# Patient Record
Sex: MALE | Race: WHITE
[De-identification: names, ages, dates, MRNs, and addresses within clinical notes are randomized per-mention and may not be internally consistent; named-entity substitution may affect disease eponyms.]

---

## 2018-06-28 ENCOUNTER — RESULT REVIEW (OUTPATIENT)
Age: 63
End: 2018-06-28

## 2018-06-28 ENCOUNTER — EMERGENCY (EMERGENCY)
Facility: HOSPITAL | Age: 63
LOS: 0 days | Discharge: HOME | End: 2018-06-28
Attending: EMERGENCY MEDICINE | Admitting: EMERGENCY MEDICINE

## 2018-06-28 VITALS
DIASTOLIC BLOOD PRESSURE: 61 MMHG | OXYGEN SATURATION: 97 % | SYSTOLIC BLOOD PRESSURE: 119 MMHG | HEART RATE: 76 BPM | RESPIRATION RATE: 18 BRPM

## 2018-06-28 VITALS
HEART RATE: 99 BPM | TEMPERATURE: 97 F | DIASTOLIC BLOOD PRESSURE: 94 MMHG | SYSTOLIC BLOOD PRESSURE: 135 MMHG | OXYGEN SATURATION: 97 % | RESPIRATION RATE: 18 BRPM

## 2018-06-28 DIAGNOSIS — R11.2 NAUSEA WITH VOMITING, UNSPECIFIED: ICD-10-CM

## 2018-06-28 DIAGNOSIS — K56.609 UNSPECIFIED INTESTINAL OBSTRUCTION, UNSPECIFIED AS TO PARTIAL VERSUS COMPLETE OBSTRUCTION: Chronic | ICD-10-CM

## 2018-06-28 DIAGNOSIS — R07.89 OTHER CHEST PAIN: ICD-10-CM

## 2018-06-28 DIAGNOSIS — Z87.19 PERSONAL HISTORY OF OTHER DISEASES OF THE DIGESTIVE SYSTEM: ICD-10-CM

## 2018-06-28 DIAGNOSIS — R42 DIZZINESS AND GIDDINESS: ICD-10-CM

## 2018-06-28 DIAGNOSIS — R07.9 CHEST PAIN, UNSPECIFIED: ICD-10-CM

## 2018-06-28 DIAGNOSIS — F41.9 ANXIETY DISORDER, UNSPECIFIED: ICD-10-CM

## 2018-06-28 DIAGNOSIS — K40.30 UNILATERAL INGUINAL HERNIA, WITH OBSTRUCTION, WITHOUT GANGRENE, NOT SPECIFIED AS RECURRENT: Chronic | ICD-10-CM

## 2018-06-28 DIAGNOSIS — T18.128A FOOD IN ESOPHAGUS CAUSING OTHER INJURY, INITIAL ENCOUNTER: ICD-10-CM

## 2018-06-28 DIAGNOSIS — F43.9 REACTION TO SEVERE STRESS, UNSPECIFIED: ICD-10-CM

## 2018-06-28 LAB
ALBUMIN SERPL ELPH-MCNC: 4.4 G/DL — SIGNIFICANT CHANGE UP (ref 3.5–5.2)
ALP SERPL-CCNC: 54 U/L — SIGNIFICANT CHANGE UP (ref 30–115)
ALT FLD-CCNC: 27 U/L — SIGNIFICANT CHANGE UP (ref 0–41)
ANION GAP SERPL CALC-SCNC: 13 MMOL/L — SIGNIFICANT CHANGE UP (ref 7–14)
APTT BLD: 28 SEC — SIGNIFICANT CHANGE UP (ref 27–39.2)
AST SERPL-CCNC: 20 U/L — SIGNIFICANT CHANGE UP (ref 0–41)
BILIRUB SERPL-MCNC: 0.8 MG/DL — SIGNIFICANT CHANGE UP (ref 0.2–1.2)
BUN SERPL-MCNC: 18 MG/DL — SIGNIFICANT CHANGE UP (ref 10–20)
CALCIUM SERPL-MCNC: 9.6 MG/DL — SIGNIFICANT CHANGE UP (ref 8.5–10.1)
CHLORIDE SERPL-SCNC: 106 MMOL/L — SIGNIFICANT CHANGE UP (ref 98–110)
CK MB CFR SERPL CALC: 2.9 NG/ML — SIGNIFICANT CHANGE UP (ref 0.6–6.3)
CK SERPL-CCNC: 232 U/L — HIGH (ref 0–225)
CO2 SERPL-SCNC: 24 MMOL/L — SIGNIFICANT CHANGE UP (ref 17–32)
CREAT SERPL-MCNC: 1.1 MG/DL — SIGNIFICANT CHANGE UP (ref 0.7–1.5)
GLUCOSE SERPL-MCNC: 110 MG/DL — HIGH (ref 70–99)
HCT VFR BLD CALC: 49.4 % — SIGNIFICANT CHANGE UP (ref 42–52)
HGB BLD-MCNC: 17 G/DL — SIGNIFICANT CHANGE UP (ref 14–18)
INR BLD: 1.06 RATIO — SIGNIFICANT CHANGE UP (ref 0.65–1.3)
MCHC RBC-ENTMCNC: 28.4 PG — SIGNIFICANT CHANGE UP (ref 27–31)
MCHC RBC-ENTMCNC: 34.4 G/DL — SIGNIFICANT CHANGE UP (ref 32–37)
MCV RBC AUTO: 82.6 FL — SIGNIFICANT CHANGE UP (ref 80–94)
NRBC # BLD: 0 /100 WBCS — SIGNIFICANT CHANGE UP (ref 0–0)
PLATELET # BLD AUTO: 130 K/UL — SIGNIFICANT CHANGE UP (ref 130–400)
POTASSIUM SERPL-MCNC: 4.1 MMOL/L — SIGNIFICANT CHANGE UP (ref 3.5–5)
POTASSIUM SERPL-SCNC: 4.1 MMOL/L — SIGNIFICANT CHANGE UP (ref 3.5–5)
PROT SERPL-MCNC: 7.1 G/DL — SIGNIFICANT CHANGE UP (ref 6–8)
PROTHROM AB SERPL-ACNC: 11.4 SEC — SIGNIFICANT CHANGE UP (ref 9.95–12.87)
RBC # BLD: 5.98 M/UL — SIGNIFICANT CHANGE UP (ref 4.7–6.1)
RBC # FLD: 12 % — SIGNIFICANT CHANGE UP (ref 11.5–14.5)
SODIUM SERPL-SCNC: 143 MMOL/L — SIGNIFICANT CHANGE UP (ref 135–146)
TROPONIN T SERPL-MCNC: <0.01 NG/ML — SIGNIFICANT CHANGE UP
TYPE + AB SCN PNL BLD: SIGNIFICANT CHANGE UP
WBC # BLD: 12.02 K/UL — HIGH (ref 4.8–10.8)
WBC # FLD AUTO: 12.02 K/UL — HIGH (ref 4.8–10.8)

## 2018-06-28 RX ORDER — GLUCAGON INJECTION, SOLUTION 0.5 MG/.1ML
1 INJECTION, SOLUTION SUBCUTANEOUS ONCE
Qty: 0 | Refills: 0 | Status: COMPLETED | OUTPATIENT
Start: 2018-06-28 | End: 2018-06-28

## 2018-06-28 RX ORDER — PANTOPRAZOLE SODIUM 20 MG/1
1 TABLET, DELAYED RELEASE ORAL
Qty: 28 | Refills: 0
Start: 2018-06-28 | End: 2018-07-11

## 2018-06-28 RX ORDER — SUCRALFATE 1 G
1 TABLET ORAL
Qty: 42 | Refills: 0
Start: 2018-06-28 | End: 2018-07-11

## 2018-06-28 RX ORDER — SODIUM CHLORIDE 9 MG/ML
1000 INJECTION, SOLUTION INTRAVENOUS ONCE
Qty: 0 | Refills: 0 | Status: COMPLETED | OUTPATIENT
Start: 2018-06-28 | End: 2018-06-28

## 2018-06-28 RX ORDER — METOCLOPRAMIDE HCL 10 MG
10 TABLET ORAL ONCE
Qty: 0 | Refills: 0 | Status: COMPLETED | OUTPATIENT
Start: 2018-06-28 | End: 2018-06-28

## 2018-06-28 RX ORDER — ONDANSETRON 8 MG/1
4 TABLET, FILM COATED ORAL ONCE
Qty: 0 | Refills: 0 | Status: COMPLETED | OUTPATIENT
Start: 2018-06-28 | End: 2018-06-28

## 2018-06-28 RX ORDER — DIPHENHYDRAMINE HCL 50 MG
25 CAPSULE ORAL ONCE
Qty: 0 | Refills: 0 | Status: COMPLETED | OUTPATIENT
Start: 2018-06-28 | End: 2018-06-28

## 2018-06-28 RX ADMIN — ONDANSETRON 4 MILLIGRAM(S): 8 TABLET, FILM COATED ORAL at 02:14

## 2018-06-28 RX ADMIN — GLUCAGON INJECTION, SOLUTION 1 MILLIGRAM(S): 0.5 INJECTION, SOLUTION SUBCUTANEOUS at 02:15

## 2018-06-28 RX ADMIN — Medication 10 MILLIGRAM(S): at 05:06

## 2018-06-28 RX ADMIN — Medication 25 MILLIGRAM(S): at 05:06

## 2018-06-28 RX ADMIN — SODIUM CHLORIDE 2000 MILLILITER(S): 9 INJECTION, SOLUTION INTRAVENOUS at 05:58

## 2018-06-28 RX ADMIN — GLUCAGON INJECTION, SOLUTION 1 MILLIGRAM(S): 0.5 INJECTION, SOLUTION SUBCUTANEOUS at 05:00

## 2018-06-28 RX ADMIN — ONDANSETRON 4 MILLIGRAM(S): 8 TABLET, FILM COATED ORAL at 03:58

## 2018-06-28 RX ADMIN — SODIUM CHLORIDE 2000 MILLILITER(S): 9 INJECTION, SOLUTION INTRAVENOUS at 03:18

## 2018-06-28 NOTE — ED PROVIDER NOTE - MEDICAL DECISION MAKING DETAILS
f/u with Dr. Osman in 1 week. Patient was instructed to have a clear liquid diet today, 1 week of puree before advancing diet. Start BID protonix and carafate TID.

## 2018-06-28 NOTE — ED PROVIDER NOTE - PROGRESS NOTE DETAILS
spoke w/ GI, will come in, pt likely needs emergent endoscopy. Requests second dose of glucagon. I signed out to Dr. Galarza DW GI fellow. Food bolus in distal esophagus was pushed through into the stomach. Patient OK for discharge with f/u with Dr. Osman in 1 week. Patient was instructed to have a clear liquid diet today, 1 week of puree before advancing diet. Start BID protonix and carafate TID. Patient back from [facility]. Feeling much better, no longer having CP. Ambulating, speaking full sentences. Discussed the plan with him and wife. Ready for DC. Pt evaluated after came back from endoscopy. Comfortable, tolerating PO, ambulating with steady gait. Will discharge, wife will drive pt home.

## 2018-06-28 NOTE — CONSULT NOTE ADULT - SUBJECTIVE AND OBJECTIVE BOX
Chief Complaint:  Patient is a 62y old  Male who presents with a chief complaint of choking and feeling of some thing stuck in the throat.    HPI:    61 y/o M with PMH of incarcerated hernia s/p repair p/w CP and FB sensation in throat since having a choking episode while eating dinner. Pt had eaten a piece of pork, lasagna and soup and feels like a piece of food got stuck in his throat, pt vomited but sensation did not go away. Pt states he has been coughing up large amounts of phlegm and gagging since the episode. States he has been under a lot of stress since losing his son recently. In the ER, patient received 2 doses of Glucagon but still unable to hold his secretin and has been spitting a lot of phlegm.  No H/O similar symptoms in the past, never had EGD done in the past, had a colonoscopy done 10 yrs ago and does not know the results.	      Allergies:  No Known Allergies    Home Medications: None    Hospital Medications:  lactated ringers Bolus 1000 milliLiter(s) IV Bolus once      PMHX/PSHX:  No pertinent past medical history  Intestinal obstruction  Strangulated inguinal hernia      ROS:     General:  No wt loss, fevers, chills, night sweats, fatigue,   Eyes:  Good vision, no reported pain  ENT:  see hPI  CV:  No pain, palpitations, hypo/hypertension  Resp:  No dyspnea, cough, tachypnea, wheezing  GI:  See HPI  :  No pain, bleeding, incontinence, nocturia  Muscle:  No pain, weakness  Neuro:  No weakness, tingling, memory problems  Heme:  No petechiae, ecchymosis, easy bruisability  Skin:  No rash, edema      PHYSICAL EXAM:     GENERAL:  Appears stated age, well-groomed, well-nourished, looks uncomfortable and spitting up saliva continuously.  HEENT:  NC/AT,  conjunctivae clear and pink,  no JVD  CHEST:  Full & symmetric excursion, no increased effort, breath sounds clear  HEART:  Regular rhythm, S1, S2, no added souds  ABDOMEN:  Soft, non-tender, non-distended, normoactive bowel sounds,  no masses ,  NEURO:  Alert, oriented, non focal    Vital Signs:  Vital Signs Last 24 Hrs  T(C): 36.2 (28 Jun 2018 01:13), Max: 36.2 (28 Jun 2018 01:13)  T(F): 97.2 (28 Jun 2018 01:13), Max: 97.2 (28 Jun 2018 01:13)  HR: 99 (28 Jun 2018 01:13) (99 - 99)  BP: 135/94 (28 Jun 2018 01:13) (135/94 - 135/94)  BP(mean): --  RR: 18 (28 Jun 2018 01:13) (18 - 18)  SpO2: 97% (28 Jun 2018 01:13) (97% - 97%)  Daily     Daily     LABS:                        17.0   12.02 )-----------( 130      ( 28 Jun 2018 02:03 )             49.4     06-28    143  |  106  |  18  ----------------------------<  110<H>  4.1   |  24  |  1.1    Ca    9.6      28 Jun 2018 02:03    TPro  7.1  /  Alb  4.4  /  TBili  0.8  /  DBili  x   /  AST  20  /  ALT  27  /  AlkPhos  54  06-28    LIVER FUNCTIONS - ( 28 Jun 2018 02:03 )  Alb: 4.4 g/dL / Pro: 7.1 g/dL / ALK PHOS: 54 U/L / ALT: 27 U/L / AST: 20 U/L / GGT: x           PT/INR - ( 28 Jun 2018 02:03 )   PT: 11.40 sec;   INR: 1.06 ratio        PTT - ( 28 Jun 2018 02:03 )  PTT:28.0 sec

## 2018-06-28 NOTE — ED ADULT NURSE REASSESSMENT NOTE - NS ED NURSE REASSESS COMMENT FT1
Pt remains spitting up phlegm. Maintaining airway without difficulty, speaking in full sentences. Awaiting GI consult. Pt and family aware of status. Comfort and safety maintained.

## 2018-06-28 NOTE — ED PROVIDER NOTE - OBJECTIVE STATEMENT
61 y/o M pmh incarcerated hernia s/p repair p/w CP and FB sensation in throat since having a choking episode while eating dinner. Pt had lasagna and soup and feels like a piece of turkey got stuck in his throat, pt vomited but sensation did not go away. Pt states he has been coughing up large amounts of phlegm and gagging since the episode. Pt also c/o dizziness and chest tightness during the day yesterday. States he has been under a lot of stress since losing his son recently.

## 2018-06-28 NOTE — ED PROVIDER NOTE - PHYSICAL EXAMINATION
AOx4, Non toxic appearing, NAD, speaking in full sentences. Skin  warm and dry, no acute rash. Head normocephalic, atraumatic. Conjunctiva and sclera clear. MM moist, no nasal discharge.  Pharynx unremarkable. Neck supple nt, no meningeal signs. Heart RRR s1s2 nl, no rub/murmur. Lungs- No retractions, BS equal, CTAB, no stridor. Abdomen soft ntnd no r/g. Extremities- moves all.

## 2018-06-28 NOTE — CONSULT NOTE ADULT - ASSESSMENT
61 y/o M with PMH of incarcerated hernia s/p repair p/w CP and FB sensation in throat since having a choking episode while eating dinner, did not respond to 2 doses of glucagon and still spitting up saliva continuously.    # Food Impaction :    - Keep NPO, Gentle hydration.  - Hold SQ anticoagulation  - Labs and imaging reviewed.  - Will arrange for urgent EGD.  - Further management depends upon the findings of EGD.

## 2018-06-28 NOTE — ED PROVIDER NOTE - ATTENDING CONTRIBUTION TO CARE
I personally evaluated the patient. I reviewed the Resident’s or Physician Assistant’s note (as assigned above), and agree with the findings and plan except as documented in my note.  62 yr M with hx of SBO from a hernia s/p surgical reduction presents with complaints of midchest foreign body sensation and associated nausea and spitting up. Started after pt ate some lasagna and turkey for dinner. No SBO, LOC. Pt with recent anxiety and weakness that he attributes to stress due to the recent death of his son. VS reviewed, pt well appearing, NAD but constantly spitting up saliva. Head ncat, pharyngeal exam w/o erythema, edema or exudates. Normal s1s2 without any murmurs, Lungs CTAB with normal work of breathing. abd +BS, s/nd/nt, extremities wnl, neuro exam grossly normal. No acute skin rashes. Plan is EKG, labs CXR and reassess.

## 2018-06-28 NOTE — ED ADULT NURSE NOTE - OBJECTIVE STATEMENT
Pt reports eating lasagna and soup this evening. Pt had an episode of emesis while eating and then felt a pain in his throat and the middle of his chest. Pt also reports mild shortness of breath. Pt states he took 1 Benadryl to try and go to sleep and Nexium to relieve symptoms but cannot keep medication down. Denies radiating pain.

## 2018-06-29 LAB — SURGICAL PATHOLOGY STUDY: SIGNIFICANT CHANGE UP

## 2019-04-11 ENCOUNTER — EMERGENCY (EMERGENCY)
Facility: HOSPITAL | Age: 64
LOS: 0 days | Discharge: HOME | End: 2019-04-11
Attending: EMERGENCY MEDICINE | Admitting: EMERGENCY MEDICINE
Payer: COMMERCIAL

## 2019-04-11 VITALS
TEMPERATURE: 97 F | HEART RATE: 95 BPM | RESPIRATION RATE: 18 BRPM | DIASTOLIC BLOOD PRESSURE: 81 MMHG | SYSTOLIC BLOOD PRESSURE: 136 MMHG | HEIGHT: 74 IN | OXYGEN SATURATION: 99 % | WEIGHT: 220.02 LBS
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
## 2019-04-11 DIAGNOSIS — K56.609 UNSPECIFIED INTESTINAL OBSTRUCTION, UNSPECIFIED AS TO PARTIAL VERSUS COMPLETE OBSTRUCTION: Chronic | ICD-10-CM

## 2019-04-11 DIAGNOSIS — S69.90XA UNSPECIFIED INJURY OF UNSPECIFIED WRIST, HAND AND FINGER(S), INITIAL ENCOUNTER: ICD-10-CM
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
## 2019-04-11 DIAGNOSIS — S69.91XA UNSPECIFIED INJURY OF RIGHT WRIST, HAND AND FINGER(S), INITIAL ENCOUNTER: ICD-10-CM

## 2019-04-11 DIAGNOSIS — Z79.899 OTHER LONG TERM (CURRENT) DRUG THERAPY: ICD-10-CM
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
## 2019-04-11 DIAGNOSIS — K40.30 UNILATERAL INGUINAL HERNIA, WITH OBSTRUCTION, WITHOUT GANGRENE, NOT SPECIFIED AS RECURRENT: Chronic | ICD-10-CM

## 2019-04-11 PROCEDURE — 99283 EMERGENCY DEPT VISIT LOW MDM: CPT | Mod: 25

## 2019-04-11 PROCEDURE — 29125 APPL SHORT ARM SPLINT STATIC: CPT

## 2019-04-11 PROCEDURE — 73130 X-RAY EXAM OF HAND: CPT | Mod: 26,RT

## 2019-04-11 NOTE — ED PROVIDER NOTE - PHYSICAL EXAMINATION
msk: right hand - +grossly swollen, with tenderness along 3rd, 4th and 5th metacarpals. good rom of digits. no snuffbox tenderness, good rom at wrist, supinaoin and pronation and flexion at elbow    skin: no lacerations, warmpth

## 2019-04-11 NOTE — ED PROVIDER NOTE - CARE PROVIDER_API CALL
Jesus Lutz)  Orthopaedic Surgery  [address] [place] [address]  Phone: [phone]  Fax: [phone]  Follow Up Time:

## 2019-04-11 NOTE — ED PROVIDER NOTE - NS ED ROS FT
Review of Systems    Constitutional: (-) fever or chills  respiratory: (-) cough (-) shortness of breath  Cardiovascular: (-) syncope, palpitations or chest pain  Integumentary: (-) rash or painful lymph nodes  Neurological: (-) altered mental status, headache or head injury  msk: +throbbing right hand pain

## 2019-04-11 NOTE — ED PROVIDER NOTE - OBJECTIVE STATEMENT
64 y/o male presents s/p fall yesterday . patient c/o throbbing pain and swelling to right hand. patient is right hand dominant. patient without tingling to fingertips. patient denies any other injuries. patient without any deformity . +worse with movement

## 2019-04-11 NOTE — ED PROVIDER NOTE - HEME LYMPH, MLM
Refill Authorization Note     is requesting a refill authorization.    Brief assessment and rationale for refill: APPROVE; prr  Name and strength of medication: LEVOTHYROXINE 0.100MG (100MCG) TAB       Medication Therapy Plan: Hypothyroid stable lco lov, continue therapy; labs recently checked TSH wnl; approve 12 more    Medication reconciliation completed: No              How patient will take medication: 1 po daily           Comments:   Lab Results   Component Value Date    TSH 1.783 03/28/2019    TSH 0.445 12/27/2018    TSH 0.218 (L) 11/16/2018    FREET4 1.18 11/16/2018    FREET4 1.48 06/13/2018    FREET4 1.01 03/27/2018     APPOINTMENTS (past 12 m or future  authorizing provider)  LAST VISIT DATE  Arpan Washington MD 2/4/2019         NEXT VISIT DATE  Arpan Washington MD 4/1/2019         No adenopathy or splenomegaly. No cervical or inguinal lymphadenopathy.

## 2019-04-11 NOTE — ED PROVIDER NOTE - NSFOLLOWUPINSTRUCTIONS_ED_ALL_ED_FT
Sprain    A sprain is a stretch or tear in one of the tough, fiber-like tissues (ligaments) in your body. This is caused by an injury to the area such as a twisting mechanism. Symptoms include pain, swelling, or bruising. Rest that area over the next several days and slowly resume activity when tolerated. Ice can help with swelling and pain.     SEEK IMMEDIATE MEDICAL CARE IF YOU HAVE ANY OF THE FOLLOWING SYMPTOMS: worsening pain, inability to move that body part, numbness or tingling.      Contusion    A contusion is a deep bruise. Contusions are the result of a blunt injury to tissues and muscle fibers under the skin. The skin overlying the contusion may turn blue, purple, or yellow. Symptoms also include pain and swelling in the injured area.    SEEK IMMEDIATE MEDICAL CARE IF YOU HAVE ANY OF THE FOLLOWING SYMPTOMS: severe pain, numbness, tingling, pain, weakness, or skin color/temperature change in any part of your body distal to the injury.

## 2019-05-24 NOTE — ED PROVIDER NOTE - NS ED ROS FT
None
Constitutional: See HPI.  Eyes: No visual changes, eye pain or discharge.  ENMT: +Fb sensation  Cardiac: + chest pain. No SOB or edema. No chest pain with exertion.  Respiratory: No cough or respiratory distress.   GI: +nausea, vomiting. No diarrhea or abdominal pain.  : No dysuria, frequency or burning.  MS: No myalgia, muscle weakness, joint pain or back pain.  Neuro: No headache or weakness. No LOC.  Skin: No skin rash.

## 2019-11-13 ENCOUNTER — OUTPATIENT (OUTPATIENT)
Dept: OUTPATIENT SERVICES | Facility: HOSPITAL | Age: 64
LOS: 1 days | Discharge: HOME | End: 2019-11-13

## 2019-11-13 ENCOUNTER — OUTPATIENT (OUTPATIENT)
Dept: OUTPATIENT SERVICES | Facility: HOSPITAL | Age: 64
LOS: 1 days | Discharge: HOME | End: 2019-11-13
Payer: COMMERCIAL

## 2019-11-13 DIAGNOSIS — K40.30 UNILATERAL INGUINAL HERNIA, WITH OBSTRUCTION, WITHOUT GANGRENE, NOT SPECIFIED AS RECURRENT: Chronic | ICD-10-CM

## 2019-11-13 DIAGNOSIS — K56.609 UNSPECIFIED INTESTINAL OBSTRUCTION, UNSPECIFIED AS TO PARTIAL VERSUS COMPLETE OBSTRUCTION: Chronic | ICD-10-CM

## 2019-11-13 DIAGNOSIS — Z00.00 ENCOUNTER FOR GENERAL ADULT MEDICAL EXAMINATION WITHOUT ABNORMAL FINDINGS: ICD-10-CM

## 2019-11-13 DIAGNOSIS — R47.02 DYSPHASIA: ICD-10-CM

## 2019-11-13 PROCEDURE — 74220 X-RAY XM ESOPHAGUS 1CNTRST: CPT | Mod: 26

## 2019-11-18 ENCOUNTER — OUTPATIENT (OUTPATIENT)
Dept: OUTPATIENT SERVICES | Facility: HOSPITAL | Age: 64
LOS: 1 days | Discharge: HOME | End: 2019-11-18
Payer: COMMERCIAL

## 2019-11-18 ENCOUNTER — RESULT REVIEW (OUTPATIENT)
Age: 64
End: 2019-11-18

## 2019-11-18 ENCOUNTER — TRANSCRIPTION ENCOUNTER (OUTPATIENT)
Age: 64
End: 2019-11-18

## 2019-11-18 VITALS
WEIGHT: 223.99 LBS | HEIGHT: 74 IN | SYSTOLIC BLOOD PRESSURE: 137 MMHG | HEART RATE: 69 BPM | TEMPERATURE: 98 F | DIASTOLIC BLOOD PRESSURE: 79 MMHG | RESPIRATION RATE: 18 BRPM

## 2019-11-18 VITALS — HEART RATE: 67 BPM | SYSTOLIC BLOOD PRESSURE: 108 MMHG | DIASTOLIC BLOOD PRESSURE: 62 MMHG | RESPIRATION RATE: 18 BRPM

## 2019-11-18 DIAGNOSIS — K40.30 UNILATERAL INGUINAL HERNIA, WITH OBSTRUCTION, WITHOUT GANGRENE, NOT SPECIFIED AS RECURRENT: Chronic | ICD-10-CM

## 2019-11-18 DIAGNOSIS — K56.609 UNSPECIFIED INTESTINAL OBSTRUCTION, UNSPECIFIED AS TO PARTIAL VERSUS COMPLETE OBSTRUCTION: Chronic | ICD-10-CM

## 2019-11-18 PROCEDURE — 88312 SPECIAL STAINS GROUP 1: CPT | Mod: 26

## 2019-11-18 PROCEDURE — 88305 TISSUE EXAM BY PATHOLOGIST: CPT | Mod: 26

## 2019-11-19 PROBLEM — K20.0 EOSINOPHILIC ESOPHAGITIS: Chronic | Status: ACTIVE | Noted: 2019-11-18

## 2019-11-20 LAB — SURGICAL PATHOLOGY STUDY: SIGNIFICANT CHANGE UP

## 2019-11-21 DIAGNOSIS — K29.50 UNSPECIFIED CHRONIC GASTRITIS WITHOUT BLEEDING: ICD-10-CM

## 2019-11-21 DIAGNOSIS — K44.9 DIAPHRAGMATIC HERNIA WITHOUT OBSTRUCTION OR GANGRENE: ICD-10-CM

## 2019-11-21 DIAGNOSIS — K29.80 DUODENITIS WITHOUT BLEEDING: ICD-10-CM

## 2019-11-21 DIAGNOSIS — R13.10 DYSPHAGIA, UNSPECIFIED: ICD-10-CM

## 2019-11-21 DIAGNOSIS — K22.8 OTHER SPECIFIED DISEASES OF ESOPHAGUS: ICD-10-CM

## 2019-12-05 ENCOUNTER — OUTPATIENT (OUTPATIENT)
Dept: OUTPATIENT SERVICES | Facility: HOSPITAL | Age: 64
LOS: 1 days | Discharge: HOME | End: 2019-12-05

## 2019-12-05 ENCOUNTER — INPATIENT (INPATIENT)
Facility: HOSPITAL | Age: 64
LOS: 5 days | Discharge: HOME | End: 2019-12-11
Attending: INTERNAL MEDICINE | Admitting: INTERNAL MEDICINE
Payer: COMMERCIAL

## 2019-12-05 ENCOUNTER — OUTPATIENT (OUTPATIENT)
Dept: OUTPATIENT SERVICES | Facility: HOSPITAL | Age: 64
LOS: 1 days | Discharge: HOME | End: 2019-12-05
Payer: COMMERCIAL

## 2019-12-05 VITALS
HEART RATE: 89 BPM | RESPIRATION RATE: 16 BRPM | OXYGEN SATURATION: 98 % | DIASTOLIC BLOOD PRESSURE: 88 MMHG | TEMPERATURE: 98 F | SYSTOLIC BLOOD PRESSURE: 142 MMHG

## 2019-12-05 DIAGNOSIS — I50.9 HEART FAILURE, UNSPECIFIED: ICD-10-CM

## 2019-12-05 DIAGNOSIS — E03.9 HYPOTHYROIDISM, UNSPECIFIED: ICD-10-CM

## 2019-12-05 DIAGNOSIS — D50.9 IRON DEFICIENCY ANEMIA, UNSPECIFIED: ICD-10-CM

## 2019-12-05 DIAGNOSIS — K56.609 UNSPECIFIED INTESTINAL OBSTRUCTION, UNSPECIFIED AS TO PARTIAL VERSUS COMPLETE OBSTRUCTION: Chronic | ICD-10-CM

## 2019-12-05 DIAGNOSIS — R10.819 ABDOMINAL TENDERNESS, UNSPECIFIED SITE: ICD-10-CM

## 2019-12-05 DIAGNOSIS — E55.9 VITAMIN D DEFICIENCY, UNSPECIFIED: ICD-10-CM

## 2019-12-05 DIAGNOSIS — K40.30 UNILATERAL INGUINAL HERNIA, WITH OBSTRUCTION, WITHOUT GANGRENE, NOT SPECIFIED AS RECURRENT: Chronic | ICD-10-CM

## 2019-12-05 DIAGNOSIS — T78.1XXA OTHER ADVERSE FOOD REACTIONS, NOT ELSEWHERE CLASSIFIED, INITIAL ENCOUNTER: ICD-10-CM

## 2019-12-05 DIAGNOSIS — D50.8 OTHER IRON DEFICIENCY ANEMIAS: ICD-10-CM

## 2019-12-05 DIAGNOSIS — E11.9 TYPE 2 DIABETES MELLITUS WITHOUT COMPLICATIONS: ICD-10-CM

## 2019-12-05 DIAGNOSIS — N42.9 DISORDER OF PROSTATE, UNSPECIFIED: ICD-10-CM

## 2019-12-05 DIAGNOSIS — E78.00 PURE HYPERCHOLESTEROLEMIA, UNSPECIFIED: ICD-10-CM

## 2019-12-05 DIAGNOSIS — B37.0 CANDIDAL STOMATITIS: ICD-10-CM

## 2019-12-05 LAB
ALBUMIN SERPL ELPH-MCNC: 4.6 G/DL — SIGNIFICANT CHANGE UP (ref 3.5–5.2)
ALP SERPL-CCNC: 57 U/L — SIGNIFICANT CHANGE UP (ref 30–115)
ALT FLD-CCNC: 23 U/L — SIGNIFICANT CHANGE UP (ref 0–41)
ANION GAP SERPL CALC-SCNC: 18 MMOL/L — HIGH (ref 7–14)
APTT BLD: 28.1 SEC — SIGNIFICANT CHANGE UP (ref 27–39.2)
AST SERPL-CCNC: 21 U/L — SIGNIFICANT CHANGE UP (ref 0–41)
BASOPHILS # BLD AUTO: 0.04 K/UL — SIGNIFICANT CHANGE UP (ref 0–0.2)
BASOPHILS NFR BLD AUTO: 0.3 % — SIGNIFICANT CHANGE UP (ref 0–1)
BILIRUB SERPL-MCNC: 1.2 MG/DL — SIGNIFICANT CHANGE UP (ref 0.2–1.2)
BLD GP AB SCN SERPL QL: SIGNIFICANT CHANGE UP
BUN SERPL-MCNC: 14 MG/DL — SIGNIFICANT CHANGE UP (ref 10–20)
CALCIUM SERPL-MCNC: 9.6 MG/DL — SIGNIFICANT CHANGE UP (ref 8.5–10.1)
CHLORIDE SERPL-SCNC: 100 MMOL/L — SIGNIFICANT CHANGE UP (ref 98–110)
CO2 SERPL-SCNC: 23 MMOL/L — SIGNIFICANT CHANGE UP (ref 17–32)
CREAT SERPL-MCNC: 1.2 MG/DL — SIGNIFICANT CHANGE UP (ref 0.7–1.5)
EOSINOPHIL # BLD AUTO: 0.3 K/UL — SIGNIFICANT CHANGE UP (ref 0–0.7)
EOSINOPHIL NFR BLD AUTO: 2.5 % — SIGNIFICANT CHANGE UP (ref 0–8)
GLUCOSE SERPL-MCNC: 107 MG/DL — HIGH (ref 70–99)
HCT VFR BLD CALC: 55.4 % — HIGH (ref 42–52)
HGB BLD-MCNC: 18.5 G/DL — HIGH (ref 14–18)
IMM GRANULOCYTES NFR BLD AUTO: 0.4 % — HIGH (ref 0.1–0.3)
INR BLD: 1.12 RATIO — SIGNIFICANT CHANGE UP (ref 0.65–1.3)
LYMPHOCYTES # BLD AUTO: 24.8 % — SIGNIFICANT CHANGE UP (ref 20.5–51.1)
LYMPHOCYTES # BLD AUTO: 3.02 K/UL — SIGNIFICANT CHANGE UP (ref 1.2–3.4)
MCHC RBC-ENTMCNC: 29 PG — SIGNIFICANT CHANGE UP (ref 27–31)
MCHC RBC-ENTMCNC: 33.4 G/DL — SIGNIFICANT CHANGE UP (ref 32–37)
MCV RBC AUTO: 86.8 FL — SIGNIFICANT CHANGE UP (ref 80–94)
MONOCYTES # BLD AUTO: 1.01 K/UL — HIGH (ref 0.1–0.6)
MONOCYTES NFR BLD AUTO: 8.3 % — SIGNIFICANT CHANGE UP (ref 1.7–9.3)
NEUTROPHILS # BLD AUTO: 7.74 K/UL — HIGH (ref 1.4–6.5)
NEUTROPHILS NFR BLD AUTO: 63.7 % — SIGNIFICANT CHANGE UP (ref 42.2–75.2)
NRBC # BLD: 0 /100 WBCS — SIGNIFICANT CHANGE UP (ref 0–0)
PLATELET # BLD AUTO: 154 K/UL — SIGNIFICANT CHANGE UP (ref 130–400)
POTASSIUM SERPL-MCNC: 4.4 MMOL/L — SIGNIFICANT CHANGE UP (ref 3.5–5)
POTASSIUM SERPL-SCNC: 4.4 MMOL/L — SIGNIFICANT CHANGE UP (ref 3.5–5)
PROT SERPL-MCNC: 7.1 G/DL — SIGNIFICANT CHANGE UP (ref 6–8)
PROTHROM AB SERPL-ACNC: 12.9 SEC — HIGH (ref 9.95–12.87)
RBC # BLD: 6.38 M/UL — HIGH (ref 4.7–6.1)
RBC # FLD: 12.9 % — SIGNIFICANT CHANGE UP (ref 11.5–14.5)
SODIUM SERPL-SCNC: 141 MMOL/L — SIGNIFICANT CHANGE UP (ref 135–146)
TROPONIN T SERPL-MCNC: <0.01 NG/ML — SIGNIFICANT CHANGE UP
WBC # BLD: 12.16 K/UL — HIGH (ref 4.8–10.8)
WBC # FLD AUTO: 12.16 K/UL — HIGH (ref 4.8–10.8)

## 2019-12-05 PROCEDURE — 93010 ELECTROCARDIOGRAM REPORT: CPT

## 2019-12-05 PROCEDURE — 76857 US EXAM PELVIC LIMITED: CPT | Mod: 26

## 2019-12-05 PROCEDURE — 99285 EMERGENCY DEPT VISIT HI MDM: CPT

## 2019-12-05 PROCEDURE — 76700 US EXAM ABDOM COMPLETE: CPT | Mod: 26

## 2019-12-05 PROCEDURE — 71045 X-RAY EXAM CHEST 1 VIEW: CPT | Mod: 26

## 2019-12-05 RX ORDER — SODIUM CHLORIDE 9 MG/ML
1000 INJECTION INTRAMUSCULAR; INTRAVENOUS; SUBCUTANEOUS
Refills: 0 | Status: DISCONTINUED | OUTPATIENT
Start: 2019-12-05 | End: 2019-12-09

## 2019-12-05 RX ORDER — NIFEDIPINE 30 MG
30 TABLET, EXTENDED RELEASE 24 HR ORAL ONCE
Refills: 0 | Status: COMPLETED | OUTPATIENT
Start: 2019-12-05 | End: 2019-12-05

## 2019-12-05 RX ORDER — ALPRAZOLAM 0.25 MG
0.25 TABLET ORAL ONCE
Refills: 0 | Status: DISCONTINUED | OUTPATIENT
Start: 2019-12-05 | End: 2019-12-05

## 2019-12-05 RX ORDER — SODIUM CHLORIDE 9 MG/ML
1000 INJECTION INTRAMUSCULAR; INTRAVENOUS; SUBCUTANEOUS ONCE
Refills: 0 | Status: COMPLETED | OUTPATIENT
Start: 2019-12-05 | End: 2019-12-05

## 2019-12-05 RX ADMIN — SODIUM CHLORIDE 1000 MILLILITER(S): 9 INJECTION INTRAMUSCULAR; INTRAVENOUS; SUBCUTANEOUS at 18:36

## 2019-12-05 NOTE — ED PROVIDER NOTE - OBJECTIVE STATEMENT
64 year old male, pmhx of HTN, come sin at the request of his pmd for evaluation of elevated Hg, patient was found to have an elevated Hg of 20 as an OP, patient endorses mild headache, patient's father has a history of Polycythemia vera. No cp/sob, no n/v/d, no loc

## 2019-12-05 NOTE — ED PROVIDER NOTE - CLINICAL SUMMARY MEDICAL DECISION MAKING FREE TEXT BOX
I personally evaluated the patient. I reviewed the Resident’s or Physician Assistant’s note (as assigned above), and agree with the findings and plan except as documented in my note. PMD would like patient admitted for hemonc consultation and possible phlebotomy as needed.

## 2019-12-05 NOTE — ED PROVIDER NOTE - PHYSICAL EXAMINATION
CONSTITUTIONAL: Well-developed; well-nourished; in no acute distress. Sitting up and providing appropriate history and physical examination  SKIN: skin exam is warm and dry, no acute rash.  HEAD: Normocephalic; atraumatic.  EYES: PERRL, 3 mm bilateral, no nystagmus, EOM intact; conjunctiva and sclera clear.  ENT: + Dry MM, No nasal discharge; airway clear.  NECK: Supple; non tender. + full passive ROM in all directions. No JVD  CARD: S1, S2 normal; no murmurs, gallops, or rubs. Regular rate and rhythm. + Symmetric Strong Pulses  RESP: No wheezes, rales or rhonchi. Good air movement bilaterally  ABD: soft; non-distended; non-tender. No Rebound, No Guarding, No signs of peritonitis, No CVA tenderness. No pulsatile abdominal mass. + Strong and Symmetric Pulses  EXT: Normal ROM. No clubbing, cyanosis or edema. Dp and Pt Pulses intact. Cap refill less than 3 seconds  NEURO: CN 2-12 intact, normal finger to nose, normal romberg, stable gait, no sensory or motor deficits, Alert, oriented, grossly unremarkable. No Focal deficits. GCS 15. NIH 0  PSYCH: Cooperative, appropriate.

## 2019-12-06 DIAGNOSIS — Z02.9 ENCOUNTER FOR ADMINISTRATIVE EXAMINATIONS, UNSPECIFIED: ICD-10-CM

## 2019-12-06 LAB
ANION GAP SERPL CALC-SCNC: 15 MMOL/L — HIGH (ref 7–14)
BASOPHILS # BLD AUTO: 0.06 K/UL — SIGNIFICANT CHANGE UP (ref 0–0.2)
BASOPHILS NFR BLD AUTO: 0.7 % — SIGNIFICANT CHANGE UP (ref 0–1)
BUN SERPL-MCNC: 11 MG/DL — SIGNIFICANT CHANGE UP (ref 10–20)
CALCIUM SERPL-MCNC: 9.3 MG/DL — SIGNIFICANT CHANGE UP (ref 8.5–10.1)
CHLORIDE SERPL-SCNC: 102 MMOL/L — SIGNIFICANT CHANGE UP (ref 98–110)
CO2 SERPL-SCNC: 23 MMOL/L — SIGNIFICANT CHANGE UP (ref 17–32)
CREAT SERPL-MCNC: 1.1 MG/DL — SIGNIFICANT CHANGE UP (ref 0.7–1.5)
EOSINOPHIL # BLD AUTO: 0.31 K/UL — SIGNIFICANT CHANGE UP (ref 0–0.7)
EOSINOPHIL NFR BLD AUTO: 3.8 % — SIGNIFICANT CHANGE UP (ref 0–8)
GLUCOSE SERPL-MCNC: 101 MG/DL — HIGH (ref 70–99)
HCT VFR BLD CALC: 55.8 % — HIGH (ref 42–52)
HCV AB S/CO SERPL IA: 0.07 S/CO — SIGNIFICANT CHANGE UP (ref 0–0.99)
HCV AB SERPL-IMP: SIGNIFICANT CHANGE UP
HGB BLD-MCNC: 18.5 G/DL — HIGH (ref 14–18)
IMM GRANULOCYTES NFR BLD AUTO: 0.4 % — HIGH (ref 0.1–0.3)
LYMPHOCYTES # BLD AUTO: 2.52 K/UL — SIGNIFICANT CHANGE UP (ref 1.2–3.4)
LYMPHOCYTES # BLD AUTO: 30.5 % — SIGNIFICANT CHANGE UP (ref 20.5–51.1)
MAGNESIUM SERPL-MCNC: 2 MG/DL — SIGNIFICANT CHANGE UP (ref 1.8–2.4)
MCHC RBC-ENTMCNC: 28.8 PG — SIGNIFICANT CHANGE UP (ref 27–31)
MCHC RBC-ENTMCNC: 33.2 G/DL — SIGNIFICANT CHANGE UP (ref 32–37)
MCV RBC AUTO: 86.8 FL — SIGNIFICANT CHANGE UP (ref 80–94)
MONOCYTES # BLD AUTO: 0.69 K/UL — HIGH (ref 0.1–0.6)
MONOCYTES NFR BLD AUTO: 8.4 % — SIGNIFICANT CHANGE UP (ref 1.7–9.3)
NEUTROPHILS # BLD AUTO: 4.65 K/UL — SIGNIFICANT CHANGE UP (ref 1.4–6.5)
NEUTROPHILS NFR BLD AUTO: 56.2 % — SIGNIFICANT CHANGE UP (ref 42.2–75.2)
NRBC # BLD: 0 /100 WBCS — SIGNIFICANT CHANGE UP (ref 0–0)
PLATELET # BLD AUTO: 143 K/UL — SIGNIFICANT CHANGE UP (ref 130–400)
POTASSIUM SERPL-MCNC: 4.1 MMOL/L — SIGNIFICANT CHANGE UP (ref 3.5–5)
POTASSIUM SERPL-SCNC: 4.1 MMOL/L — SIGNIFICANT CHANGE UP (ref 3.5–5)
RBC # BLD: 6.43 M/UL — HIGH (ref 4.7–6.1)
RBC # FLD: 13.2 % — SIGNIFICANT CHANGE UP (ref 11.5–14.5)
SODIUM SERPL-SCNC: 140 MMOL/L — SIGNIFICANT CHANGE UP (ref 135–146)
WBC # BLD: 8.26 K/UL — SIGNIFICANT CHANGE UP (ref 4.8–10.8)
WBC # FLD AUTO: 8.26 K/UL — SIGNIFICANT CHANGE UP (ref 4.8–10.8)

## 2019-12-06 PROCEDURE — 99223 1ST HOSP IP/OBS HIGH 75: CPT | Mod: AI

## 2019-12-06 PROCEDURE — 99222 1ST HOSP IP/OBS MODERATE 55: CPT

## 2019-12-06 RX ORDER — MOMETASONE FUROATE 220 UG/1
2 INHALANT RESPIRATORY (INHALATION)
Refills: 0 | Status: DISCONTINUED | OUTPATIENT
Start: 2019-12-06 | End: 2019-12-11

## 2019-12-06 RX ORDER — ACETAMINOPHEN 500 MG
650 TABLET ORAL EVERY 6 HOURS
Refills: 0 | Status: DISCONTINUED | OUTPATIENT
Start: 2019-12-06 | End: 2019-12-11

## 2019-12-06 RX ORDER — PANTOPRAZOLE SODIUM 20 MG/1
40 TABLET, DELAYED RELEASE ORAL
Refills: 0 | Status: DISCONTINUED | OUTPATIENT
Start: 2019-12-06 | End: 2019-12-11

## 2019-12-06 RX ORDER — ENOXAPARIN SODIUM 100 MG/ML
40 INJECTION SUBCUTANEOUS DAILY
Refills: 0 | Status: DISCONTINUED | OUTPATIENT
Start: 2019-12-06 | End: 2019-12-11

## 2019-12-06 RX ORDER — FLUTICASONE PROPIONATE 220 MCG
2 AEROSOL WITH ADAPTER (GRAM) INHALATION
Refills: 0 | Status: DISCONTINUED | OUTPATIENT
Start: 2019-12-06 | End: 2019-12-06

## 2019-12-06 RX ORDER — ALPRAZOLAM 0.25 MG
0.25 TABLET ORAL AT BEDTIME
Refills: 0 | Status: DISCONTINUED | OUTPATIENT
Start: 2019-12-06 | End: 2019-12-09

## 2019-12-06 RX ORDER — CHLORHEXIDINE GLUCONATE 213 G/1000ML
1 SOLUTION TOPICAL
Refills: 0 | Status: DISCONTINUED | OUTPATIENT
Start: 2019-12-06 | End: 2019-12-11

## 2019-12-06 RX ORDER — NIFEDIPINE 30 MG
30 TABLET, EXTENDED RELEASE 24 HR ORAL DAILY
Refills: 0 | Status: DISCONTINUED | OUTPATIENT
Start: 2019-12-06 | End: 2019-12-11

## 2019-12-06 RX ADMIN — SODIUM CHLORIDE 75 MILLILITER(S): 9 INJECTION INTRAMUSCULAR; INTRAVENOUS; SUBCUTANEOUS at 15:56

## 2019-12-06 RX ADMIN — Medication 30 MILLIGRAM(S): at 00:01

## 2019-12-06 RX ADMIN — PANTOPRAZOLE SODIUM 40 MILLIGRAM(S): 20 TABLET, DELAYED RELEASE ORAL at 06:26

## 2019-12-06 RX ADMIN — Medication 0.25 MILLIGRAM(S): at 00:01

## 2019-12-06 RX ADMIN — ENOXAPARIN SODIUM 40 MILLIGRAM(S): 100 INJECTION SUBCUTANEOUS at 12:07

## 2019-12-06 RX ADMIN — CHLORHEXIDINE GLUCONATE 1 APPLICATION(S): 213 SOLUTION TOPICAL at 05:30

## 2019-12-06 RX ADMIN — Medication 650 MILLIGRAM(S): at 12:36

## 2019-12-06 RX ADMIN — Medication 650 MILLIGRAM(S): at 12:06

## 2019-12-06 RX ADMIN — Medication 0.25 MILLIGRAM(S): at 21:20

## 2019-12-06 RX ADMIN — SODIUM CHLORIDE 75 MILLILITER(S): 9 INJECTION INTRAMUSCULAR; INTRAVENOUS; SUBCUTANEOUS at 00:02

## 2019-12-06 RX ADMIN — Medication 30 MILLIGRAM(S): at 06:26

## 2019-12-06 NOTE — SWALLOW BEDSIDE ASSESSMENT ADULT - SWALLOW EVAL: DIAGNOSIS
+toleration for Dysphagia Diet III Mechanical soft consistency with cut up meats, regular, thins with no overt s/s aspiration vs penetration

## 2019-12-06 NOTE — SWALLOW BEDSIDE ASSESSMENT ADULT - COMMENTS
pt received OOB, AAOx4. normal breath patterns, on RA. pt reports esophageal discomfort during mealtime, consuming hard solids. functional oropharyngeal swallow +toleration with no overt s/s of aspiration vs penetration  pt prefers soft consistency

## 2019-12-06 NOTE — H&P ADULT - HISTORY OF PRESENT ILLNESS
65 y/o male with pMHx of HTN was sent in by his PMD Dr. Simon for evaluation of elevated Hgb. Pt reports he was having a headache the night before so he sent him at [facility] for blood work and was found to have hgb of 20. Pt says his father had a history of Polycythemia Vera. Pt admits to having a Headache, Dry mouth, intermittent fever/chills, intermittent itching, abdominal pain and generalized body aches started few days ago and progressively had gotten worse. Denies CP, SOB, nausea, vomiting, diarrhea, constipation and dysuria.     Pt also reports that he had EGD done recently for dysphagia. GE junction bx showed intraepithelial eosinophils and reflex esophagitis. says he is supposed to get another EGD.  ED vitals: /88, HR 89, RR 16, T 97.9F, sPO2 98%
[Menstruating] : menstruating

## 2019-12-06 NOTE — H&P ADULT - NSHPPHYSICALEXAM_GEN_ALL_CORE
GENERAL: NAD, Resting in bed  HEAD:  Atraumatic, Normocephalic  EYES: EOMI, PERRLA, conjunctiva and sclera clear  ENT: Moist mucous membranes  NECK: Supple, No JVD  CHEST/LUNG: Clear to auscultation bilaterally; No rales, rhonchi, wheezing, or rubs. Unlabored respirations  HEART: Regular rate and rhythm; No murmurs, rubs, or gallops  ABDOMEN: Bowel sounds present; Soft, Nontender, Nondistended.   EXTREMITIES:  No clubbing, cyanosis, or edema  NERVOUS SYSTEM:  Alert & Oriented X3  SKIN: flushed skin throughout GENERAL: NAD, Resting in bed  HEAD:  Atraumatic, Normocephalic  EYES: EOMI, PERRLA, conjunctiva and sclera clear  ENT: Moist mucous membranes  NECK: Supple, No JVD  Pulm: Clear to auscultation bilaterally; No rales, rhonchi, wheezing, or rubs. Unlabored respirations  CV:s1,s2,rrr  GI: Bowel sounds present; Soft, Nontender, Nondistended.   EXTREMITIES:  No clubbing, cyanosis, or edema  NERVOUS SYSTEM:  Alert & Oriented X3  SKIN: flushed skin throughout

## 2019-12-06 NOTE — H&P ADULT - NSHPLABSRESULTS_GEN_ALL_CORE
18.5   12.16 )-----------( 154      ( 05 Dec 2019 18:24 )             55.4       12-05    141  |  100  |  14  ----------------------------<  107<H>  4.4   |  23  |  1.2    Ca    9.6      05 Dec 2019 18:24    TPro  7.1  /  Alb  4.6  /  TBili  1.2  /  DBili  x   /  AST  21  /  ALT  23  /  AlkPhos  57  12-05            PT/INR - ( 05 Dec 2019 18:24 )   PT: 12.90 sec;   INR: 1.12 ratio         PTT - ( 05 Dec 2019 18:24 )  PTT:28.1 sec    Lactate Trend      CARDIAC MARKERS ( 05 Dec 2019 18:24 )  x     / <0.01 ng/mL / x     / x     / x        CAPILLARY BLOOD GLUCOSE      < from: US Urinary Bladder (12.05.19 @ 09:29) >    Impression    No post void residual.    Bilateral ureteral jets are not identified and therefore an obstructing   process at either ureterovesical junction cannot be excluded on this exam.    Prostate volume 36 cc.    < end of copied text >    < from: US Abdomen Complete (12.05.19 @ 09:28) >    IMPRESSION:    Echogenic liver consistent with fatty infiltration.    No evidence of gallstones or hydronephrosis    < end of copied text > 18.5   12.16 )-----------( 154      ( 05 Dec 2019 18:24 )             55.4       12-05    141  |  100  |  14  ----------------------------<  107<H>  4.4   |  23  |  1.2    Ca    9.6      05 Dec 2019 18:24    TPro  7.1  /  Alb  4.6  /  TBili  1.2  /  DBili  x   /  AST  21  /  ALT  23  /  AlkPhos  57  12-05            PT/INR - ( 05 Dec 2019 18:24 )   PT: 12.90 sec;   INR: 1.12 ratio         PTT - ( 05 Dec 2019 18:24 )  PTT:28.1 sec    Lactate Trend      CARDIAC MARKERS ( 05 Dec 2019 18:24 )  x     / <0.01 ng/mL / x     / x     / x        CAPILLARY BLOOD GLUCOSE      < from: US Urinary Bladder (12.05.19 @ 09:29) >    Impression    No post void residual.    Bilateral ureteral jets are not identified and therefore an obstructing   process at either ureterovesical junction cannot be excluded on this exam.    Prostate volume 36 cc.    < end of copied text >    < from: US Abdomen Complete (12.05.19 @ 09:28) >        Echogenic liver consistent with fatty infiltration.    No evidence of gallstones or hydronephrosis      Chest xray which I reviewed shows no acute changes   EKG which I reviewed shows NSR

## 2019-12-06 NOTE — SWALLOW BEDSIDE ASSESSMENT ADULT - SLP PERTINENT HISTORY OF CURRENT PROBLEM
63 y/o male with pMHx of HTN was sent in by his PMD Dr. Simon for evaluation of elevated Hgb. Pt reports he was having a headache the night before so he sent him at [facility] for blood work and was found to have hgb of 20. Pt says his father had a history of Polycythemia Vera. EGD done 2 weeks ago with EoE.

## 2019-12-06 NOTE — H&P ADULT - ASSESSMENT
65 y/o male with pMHx of HTN was sent in by his PMD Dr. Simon for evaluation of elevated Hgb.    #) Elevated hgb  - r/o polycythemia vera  - no splenomegaly as per abdominal US  - f/u serum EPO levels and JAK2 mutation screen  - f/u Heme/Onc  - cont NS at 75ml/hr    #) HTN - controlled  - cont home meds    #) Dysphagia  - f/u outpatient with GI  - f/u speech and swallow eval    #) DVT prophylaxis - Lovenox  #) GI prophylaxis - PPI  #) Diet -   Full Code 65 y/o male with pMHx of HTN was sent in by his PMD Dr. Simon for evaluation of elevated Hgb.    #) Elevated hgb  - r/o polycythemia vera  - no splenomegaly as per abdominal US  - f/u serum EPO levels and JAK2 and JAK2 exon 12 mutation screen  - f/u Heme/Onc  - cont NS at 75ml/hr    #) HTN - controlled  - cont home meds    #) Dysphagia  - f/u outpatient with GI  - f/u speech and swallow eval    #) DVT prophylaxis - Lovenox  #) GI prophylaxis - PPI  #) Diet -   Full Code 63 y/o male with pMHx of HTN was sent in by his PMD Dr. Simon for evaluation of elevated Hgb.    #) Elevated hgb  - r/o polycythemia vera  - no splenomegaly as per abdominal US  - f/u serum EPO levels and JAK2 and JAK2 exon 12 mutation screen  - f/u Heme/Onc  - cont NS at 75ml/hr    #) HTN - controlled  - cont home meds    #) Dysphagia  - f/u outpatient with GI  - f/u speech and swallow eval    #) DVT prophylaxis - Lovenox  #) GI prophylaxis - PPI  #) Diet - DASH  Full Code

## 2019-12-06 NOTE — H&P ADULT - ATTENDING COMMENTS
patient seen and examined independently     elevated HGB with headaches:  hgb 18.5 today   patient used to donate blood but has not done  it in a while he says. he donated blood without knowing that his hgb is high   erythropoietin level pending   JAK2 ordered   his father had polycythemia vera per patient     Dysphagia and dry mouth:   patient had recent EGD with Dr Soria. in november and showed the following:   Normal mucosa in the whole esophagus.   Normal mucosa in the stomach.  Normal mucosa in the whole examined duodenum.   The esophagus was diated with a 52 F mALONETY DILATOR W/O RESISTANCE.   Dilation in the lower third of the esophagus.   patient on dysphagia diet per speech and swallow   patient requested to be sen by Dr Soria will consult     #Progress Note Handoff  Pending (specify):  Hematology. GI   Family discussion:patient   Disposition: Home patient seen and examined independently     elevated HGB with headaches:  hgb 18.5 today   patient used to donate blood but has not done  it in a while he says. he donated blood without knowing that his hgb is high   erythropoietin level pending   JAK2 ordered   his father had polycythemia vera per patient     Dysphagia and dry mouth:   patient had recent EGD with Dr Soria. in november and showed the following:   Normal mucosa in the whole esophagus.   Normal mucosa in the stomach.  Normal mucosa in the whole examined duodenum.   The esophagus was diated with a 52 F mALONETY DILATOR W/O RESISTANCE.   Dilation in the lower third of the esophagus.   patient on dysphagia diet per speech and swallow   patient requested to be sen by Dr Soria will consult     #fatty liver: OPT follow up     #Progress Note Handoff  Pending (specify):  Hematology. GI   Family discussion:patient   Disposition: Home

## 2019-12-06 NOTE — H&P ADULT - NSICDXFAMILYHX_GEN_ALL_CORE_FT
FAMILY HISTORY:  Family history of bone cancer, mother; type not known.  Family history of heart disease, father  Family history of polycythemia vera, father

## 2019-12-06 NOTE — PATIENT PROFILE ADULT - CONTRAINDICATIONS & PRECAUTIONS (SELECT ALL THAT APPLY)
[facility] Hospitalist Progress Note    Date of Service: 2018    Chief Complaint  83 y.o. male admitted 2018 with SBO    Interval Problem Update  : Still not passing much gas. Lactic acidosis resolved. Ordered home health but declined by several agencies. SBFT now pending    Disposition  Pending improvement of SBO  Ordered home health but rejected by some agencies        Review of Systems   Constitutional: Negative for chills and fever.   Respiratory: Negative for cough, shortness of breath and wheezing.    Cardiovascular: Negative for chest pain.   Gastrointestinal: Positive for abdominal pain and nausea. Negative for constipation, diarrhea and vomiting.   Genitourinary: Negative for dysuria.   Neurological: Negative for headaches.      Physical Exam  Laboratory/Imaging   Hemodynamics  Temp (24hrs), Av.9 °C (98.5 °F), Min:36.4 °C (97.6 °F), Max:37.4 °C (99.4 °F)   Temperature: 36.5 °C (97.7 °F)  Pulse  Av.9  Min: 68  Max: 103   Blood Pressure : 123/70      Respiratory      Respiration: 16, Pulse Oximetry: 99 %        RUL Breath Sounds: Clear, RML Breath Sounds: Diminished, RLL Breath Sounds: Diminished, LEXA Breath Sounds: Clear, LLL Breath Sounds: Diminished    Fluids    Intake/Output Summary (Last 24 hours) at 18 1718  Last data filed at 18 1555   Gross per 24 hour   Intake             1000 ml   Output             2075 ml   Net            -1075 ml       Nutrition  Orders Placed This Encounter   Procedures   • Diet NPO     Standing Status:   Standing     Number of Occurrences:   1     Order Specific Question:   Restrict to:     Answer:   Ice Chips [2]     Physical Exam   Constitutional: He appears well-developed.   HENT:   Head: Normocephalic.   Cardiovascular: Normal rate.  Exam reveals no gallop.    Pulmonary/Chest: No respiratory distress. He has no wheezes.   Abdominal: He exhibits distension. There is tenderness. There is no rebound.   Neurological: He is alert.       Recent Labs       06/26/18   0255  06/27/18   0609   WBC  10.2  8.1   RBC  3.62*  3.53*   HEMOGLOBIN  11.0*  10.7*   HEMATOCRIT  33.4*  33.6*   MCV  92.3  95.2   MCH  30.4  30.3   MCHC  32.9*  31.8*   RDW  52.2*  55.8*   PLATELETCT  184  206   MPV  9.9  9.1     Recent Labs      06/26/18   0255  06/27/18   0609   SODIUM  135  139   POTASSIUM  3.8  4.1   CHLORIDE  106  109   CO2  20  21   GLUCOSE  156*  104*   BUN  30*  32*   CREATININE  1.12  1.23   CALCIUM  8.6  8.4*                      Assessment/Plan     * SBO (small bowel obstruction) ([facility])   Assessment & Plan    Initially trying conservative management with NPO  IV fluid hydration and symptomatic management for pain and nausea.   Dr. Gardner of surgery was consulted  NG tube to intermittent low wall suction.  SBFT pending        T2DM (type 2 diabetes mellitus) ([facility])   Assessment & Plan    No long-acting insulin while nothing by mouth, monitor with Accu-Cheks and cover with insulin sliding scale.        HLD (hyperlipidemia)   Assessment & Plan    Holding home statin for now while he is nothing by mouth. Resume once cleared by surgery for by mouth intake.        HTN (hypertension)   Assessment & Plan    Holding home antihypertensives for now while he is nothing by mouth, cover with when necessary IV antihypertensives.          Quality-Core Measures   Reviewed items::  Labs reviewed and Medications reviewed  Crater catheter::  No Carter  DVT prophylaxis pharmacological::  Heparin         Patient/surrogate refused vaccine...

## 2019-12-07 LAB
ALBUMIN SERPL ELPH-MCNC: 4.4 G/DL — SIGNIFICANT CHANGE UP (ref 3.5–5.2)
ALP SERPL-CCNC: 56 U/L — SIGNIFICANT CHANGE UP (ref 30–115)
ALT FLD-CCNC: 23 U/L — SIGNIFICANT CHANGE UP (ref 0–41)
ANION GAP SERPL CALC-SCNC: 15 MMOL/L — HIGH (ref 7–14)
AST SERPL-CCNC: 21 U/L — SIGNIFICANT CHANGE UP (ref 0–41)
BASOPHILS # BLD AUTO: 0.07 K/UL — SIGNIFICANT CHANGE UP (ref 0–0.2)
BASOPHILS NFR BLD AUTO: 0.7 % — SIGNIFICANT CHANGE UP (ref 0–1)
BILIRUB SERPL-MCNC: 1.2 MG/DL — SIGNIFICANT CHANGE UP (ref 0.2–1.2)
BUN SERPL-MCNC: 12 MG/DL — SIGNIFICANT CHANGE UP (ref 10–20)
CALCIUM SERPL-MCNC: 9.3 MG/DL — SIGNIFICANT CHANGE UP (ref 8.5–10.1)
CHLORIDE SERPL-SCNC: 102 MMOL/L — SIGNIFICANT CHANGE UP (ref 98–110)
CO2 SERPL-SCNC: 23 MMOL/L — SIGNIFICANT CHANGE UP (ref 17–32)
CREAT SERPL-MCNC: 1.1 MG/DL — SIGNIFICANT CHANGE UP (ref 0.7–1.5)
EOSINOPHIL # BLD AUTO: 0.31 K/UL — SIGNIFICANT CHANGE UP (ref 0–0.7)
EOSINOPHIL NFR BLD AUTO: 3.1 % — SIGNIFICANT CHANGE UP (ref 0–8)
EPO SERPL-MCNC: 2.3 MIU/ML — LOW (ref 2.6–18.5)
GLUCOSE SERPL-MCNC: 116 MG/DL — HIGH (ref 70–99)
HCT VFR BLD CALC: 53.3 % — HIGH (ref 42–52)
HGB BLD-MCNC: 18.1 G/DL — HIGH (ref 14–18)
IMM GRANULOCYTES NFR BLD AUTO: 0.3 % — SIGNIFICANT CHANGE UP (ref 0.1–0.3)
LYMPHOCYTES # BLD AUTO: 2.28 K/UL — SIGNIFICANT CHANGE UP (ref 1.2–3.4)
LYMPHOCYTES # BLD AUTO: 23.1 % — SIGNIFICANT CHANGE UP (ref 20.5–51.1)
MCHC RBC-ENTMCNC: 29.3 PG — SIGNIFICANT CHANGE UP (ref 27–31)
MCHC RBC-ENTMCNC: 34 G/DL — SIGNIFICANT CHANGE UP (ref 32–37)
MCV RBC AUTO: 86.2 FL — SIGNIFICANT CHANGE UP (ref 80–94)
MONOCYTES # BLD AUTO: 0.69 K/UL — HIGH (ref 0.1–0.6)
MONOCYTES NFR BLD AUTO: 7 % — SIGNIFICANT CHANGE UP (ref 1.7–9.3)
NEUTROPHILS # BLD AUTO: 6.48 K/UL — SIGNIFICANT CHANGE UP (ref 1.4–6.5)
NEUTROPHILS NFR BLD AUTO: 65.8 % — SIGNIFICANT CHANGE UP (ref 42.2–75.2)
NRBC # BLD: 0 /100 WBCS — SIGNIFICANT CHANGE UP (ref 0–0)
PLATELET # BLD AUTO: 141 K/UL — SIGNIFICANT CHANGE UP (ref 130–400)
POTASSIUM SERPL-MCNC: 3.7 MMOL/L — SIGNIFICANT CHANGE UP (ref 3.5–5)
POTASSIUM SERPL-SCNC: 3.7 MMOL/L — SIGNIFICANT CHANGE UP (ref 3.5–5)
PROT SERPL-MCNC: 7 G/DL — SIGNIFICANT CHANGE UP (ref 6–8)
RBC # BLD: 6.18 M/UL — HIGH (ref 4.7–6.1)
RBC # FLD: 12.8 % — SIGNIFICANT CHANGE UP (ref 11.5–14.5)
SODIUM SERPL-SCNC: 140 MMOL/L — SIGNIFICANT CHANGE UP (ref 135–146)
WBC # BLD: 9.86 K/UL — SIGNIFICANT CHANGE UP (ref 4.8–10.8)
WBC # FLD AUTO: 9.86 K/UL — SIGNIFICANT CHANGE UP (ref 4.8–10.8)

## 2019-12-07 PROCEDURE — 99233 SBSQ HOSP IP/OBS HIGH 50: CPT

## 2019-12-07 RX ADMIN — PANTOPRAZOLE SODIUM 40 MILLIGRAM(S): 20 TABLET, DELAYED RELEASE ORAL at 06:25

## 2019-12-07 RX ADMIN — SODIUM CHLORIDE 75 MILLILITER(S): 9 INJECTION INTRAMUSCULAR; INTRAVENOUS; SUBCUTANEOUS at 05:48

## 2019-12-07 RX ADMIN — Medication 0.25 MILLIGRAM(S): at 21:33

## 2019-12-07 RX ADMIN — Medication 30 MILLIGRAM(S): at 05:48

## 2019-12-07 RX ADMIN — ENOXAPARIN SODIUM 40 MILLIGRAM(S): 100 INJECTION SUBCUTANEOUS at 12:58

## 2019-12-07 NOTE — PROGRESS NOTE ADULT - ASSESSMENT
Patient is a 64y old  Male who presents with a chief complaint of Elevated Hgb (06 Dec 2019 17:23)    #Elevated hermoglobin secondary to suspicion of polycythemia vera  sp phlebotomy  workup pending  family history + for PV in father  hemoglobin level improved    #Dysphagia   awaiting gi consult dr carpenter   will order swallow study for now     #eosinophilic esophagitis ppi     #CKD 2     #DVT PPX    Progress Note Handoff    Pending:  will order swallowing study, gi consult , hem follow up    Family discussion: patient is of sound mind and agrees to plan of care    Disposition: Home___

## 2019-12-07 NOTE — PROGRESS NOTE ADULT - SUBJECTIVE AND OBJECTIVE BOX
DARNELL DANIELS  64y  [facility] [address]      Patient is a 64y old  Male who presents with a chief complaint of Elevated Hgb (06 Dec 2019 17:23)      INTERVAL HPI/OVERNIGHT EVENTS:    complaining of feeling of food stuck in upper esophagus    REVIEW OF SYSTEMS:  CONSTITUTIONAL: No fever, weight loss, or fatigue  EYES: No eye pain, visual disturbances, or discharge  ENMT:  No difficulty hearing, tinnitus, vertigo; No sinus or throat pain  NECK: No pain or stiffness  BREASTS: No pain, masses, or nipple discharge  RESPIRATORY: No cough, wheezing, chills or hemoptysis; No shortness of breath  CARDIOVASCULAR: No chest pain, palpitations, dizziness, or leg swelling  GASTROINTESTINAL:+ dysphagia upper  GENITOURINARY: No dysuria, frequency, hematuria, or incontinence  NEUROLOGICAL: No headaches, memory loss, loss of strength, numbness, or tremors  SKIN: No itching, burning, rashes, or lesions   LYMPH NODES: No enlarged glands  ENDOCRINE: No heat or cold intolerance; No hair loss  MUSCULOSKELETAL: No joint pain or swelling; No muscle, back, or extremity pain  PSYCHIATRIC: No depression, anxiety, mood swings, or difficulty sleeping  HEME/LYMPH: No easy bruising, or bleeding gums  ALLERY AND IMMUNOLOGIC: No hives or eczema  FAMILY HISTORY:  Family history of bone cancer: mother; type not known.  Family history of heart disease: father  Family history of polycythemia vera: father    T(C): 36.3 (12-07-19 @ 05:14), Max: 36.5 (12-06-19 @ 21:05)  HR: 94 (12-07-19 @ 12:00) (76 - 103)  BP: 137/63 (12-07-19 @ 12:00) (134/78 - 147/81)  RR: 18 (12-07-19 @ 12:00) (18 - 18)  SpO2: --  Wt(kg): --Vital Signs Last 24 Hrs  T(C): 36.3 (07 Dec 2019 05:14), Max: 36.5 (06 Dec 2019 21:05)  T(F): 97.4 (07 Dec 2019 05:14), Max: 97.7 (06 Dec 2019 21:05)  HR: 94 (07 Dec 2019 12:00) (76 - 103)  BP: 137/63 (07 Dec 2019 12:00) (134/78 - 147/81)  BP(mean): --  RR: 18 (07 Dec 2019 12:00) (18 - 18)  SpO2: --    PHYSICAL EXAM:  GENERAL: NAD, well-groomed, well-developed  HEAD:  Atraumatic, Normocephalic  EYES: EOMI, PERRLA, conjunctiva and sclera clear  ENMT: No tonsillar erythema, exudates, or enlargement; Moist mucous membranes, Good dentition, No lesions  NECK: Supple, No JVD, Normal thyroid  NERVOUS SYSTEM:  Alert & Oriented X3, Good concentration;   PULM: Clear to auscultation bilaterally  CARDIAC: Regular rate and rhythm; No murmurs, rubs, or gallops  GI: Soft, Nontender, Nondistended; Bowel sounds present  EXTREMITIES:  2+ Peripheral Pulses, No clubbing, cyanosis, or edema  LYMPH: No lymphadenopathy noted  SKIN: No rashes or lesions                            18.1   9.86  )-----------( 141      ( 07 Dec 2019 06:04 )             53.3   12-07    140  |  102  |  12  ----------------------------<  116<H>  3.7   |  23  |  1.1    Ca    9.3      07 Dec 2019 06:04  Mg     2.0     12-06    TPro  7.0  /  Alb  4.4  /  TBili  1.2  /  DBili  x   /  AST  21  /  ALT  23  /  AlkPhos  56  12-07            acetaminophen   Tablet .. 650 milliGRAM(s) Oral every 6 hours PRN  ALPRAZolam 0.25 milliGRAM(s) Oral at bedtime  chlorhexidine 4% Liquid 1 Application(s) Topical <User Schedule>  enoxaparin Injectable 40 milliGRAM(s) SubCutaneous daily  mometasone 220 MICROgram(s) Inhaler 2 Puff(s) Inhalation two times a day  NIFEdipine XL 30 milliGRAM(s) Oral daily  pantoprazole    Tablet 40 milliGRAM(s) Oral before breakfast  sodium chloride 0.9%. 1000 milliLiter(s) IV Continuous <Continuous>      HEALTH ISSUES - PROBLEM Dx:          Case Discussed with House Staff     Spectra [phone]

## 2019-12-08 LAB — GLUCOSE BLDC GLUCOMTR-MCNC: 112 MG/DL — HIGH (ref 70–99)

## 2019-12-08 PROCEDURE — 99233 SBSQ HOSP IP/OBS HIGH 50: CPT

## 2019-12-08 RX ADMIN — Medication 0.25 MILLIGRAM(S): at 21:10

## 2019-12-08 RX ADMIN — PANTOPRAZOLE SODIUM 40 MILLIGRAM(S): 20 TABLET, DELAYED RELEASE ORAL at 06:40

## 2019-12-08 RX ADMIN — ENOXAPARIN SODIUM 40 MILLIGRAM(S): 100 INJECTION SUBCUTANEOUS at 17:08

## 2019-12-08 RX ADMIN — Medication 30 MILLIGRAM(S): at 05:54

## 2019-12-08 NOTE — PROGRESS NOTE ADULT - SUBJECTIVE AND OBJECTIVE BOX
TIARRAJORDYDARNELL ANDRE  64y  [facility] [address]      Patient is a 64y old  Male who presents with a chief complaint of Elevated Hgb (07 Dec 2019 14:28)      INTERVAL HPI/OVERNIGHT EVENTS:  no acute events overnight       REVIEW OF SYSTEMS:  CONSTITUTIONAL: No fever, weight loss, or fatigue  EYES: No eye pain, visual disturbances, or discharge  ENMT:  right sided jaw pain   NECK: No pain or stiffness  BREASTS: No pain, masses, or nipple discharge  RESPIRATORY: No cough, wheezing, chills or hemoptysis; No shortness of breath  CARDIOVASCULAR: No chest pain, palpitations, dizziness, or leg swelling  GASTROINTESTINAL: intermittent feeling of food stuck in esophagus   GENITOURINARY: No dysuria, frequency, hematuria, or incontinence  NEUROLOGICAL: No headaches, memory loss, loss of strength, numbness, or tremors  SKIN: No itching, burning, rashes, or lesions   LYMPH NODES: No enlarged glands  ENDOCRINE: No heat or cold intolerance; No hair loss  MUSCULOSKELETAL: No joint pain or swelling; No muscle, back, or extremity pain  PSYCHIATRIC: No depression, anxiety, mood swings, or difficulty sleeping  HEME/LYMPH: No easy bruising, or bleeding gums  ALLERY AND IMMUNOLOGIC: No hives or eczema  FAMILY HISTORY:  Family history of bone cancer: mother; type not known.  Family history of heart disease: father  Family history of polycythemia vera: father    T(C): 36.1 (12-08-19 @ 05:02), Max: 36.1 (12-07-19 @ 20:56)  HR: 94 (12-08-19 @ 05:02) (84 - 94)  BP: 134/62 (12-08-19 @ 05:02) (134/62 - 137/82)  RR: 18 (12-08-19 @ 05:02) (18 - 18)  SpO2: --  Wt(kg): --Vital Signs Last 24 Hrs  T(C): 36.1 (08 Dec 2019 05:02), Max: 36.1 (07 Dec 2019 20:56)  T(F): 96.9 (08 Dec 2019 05:02), Max: 96.9 (07 Dec 2019 20:56)  HR: 94 (08 Dec 2019 05:02) (84 - 94)  BP: 134/62 (08 Dec 2019 05:02) (134/62 - 137/82)  BP(mean): --  RR: 18 (08 Dec 2019 05:02) (18 - 18)  SpO2: --    PHYSICAL EXAM:  GENERAL: NAD, well-groomed, well-developed  HEAD:  Atraumatic, Normocephalic  EYES: EOMI, PERRLA, conjunctiva and sclera clear  ENMT: No tonsillar erythema, exudates, or enlargement; Moist mucous membranes, Good dentition, No lesions  NECK: Supple, No JVD, Normal thyroid  NERVOUS SYSTEM:  Alert & Oriented X3, Good concentration; Motor Strength 5/5 B/L upper and lower extremities; DTRs 2+ intact and symmetric  PULM: Clear to auscultation bilaterally  CARDIAC: Regular rate and rhythm; No murmurs, rubs, or gallops  GI: Soft, Nontender, Nondistended; Bowel sounds present  EXTREMITIES:  2+ Peripheral Pulses, No clubbing, cyanosis, or edema  LYMPH: No lymphadenopathy noted  SKIN: No rashes or lesions    Consultant(s) Notes Reviewed:  [x ] YES  [ ] NO  Care Discussed with Consultants/Other Providers [ x] YES  [ ] NO    LABS:                            18.1   9.86  )-----------( 141      ( 07 Dec 2019 06:04 )             53.3   12-07    140  |  102  |  12  ----------------------------<  116<H>  3.7   |  23  |  1.1    Ca    9.3      07 Dec 2019 06:04    TPro  7.0  /  Alb  4.4  /  TBili  1.2  /  DBili  x   /  AST  21  /  ALT  23  /  AlkPhos  56  12-07            acetaminophen   Tablet .. 650 milliGRAM(s) Oral every 6 hours PRN  ALPRAZolam 0.25 milliGRAM(s) Oral at bedtime  chlorhexidine 4% Liquid 1 Application(s) Topical <User Schedule>  enoxaparin Injectable 40 milliGRAM(s) SubCutaneous daily  mometasone 220 MICROgram(s) Inhaler 2 Puff(s) Inhalation two times a day  NIFEdipine XL 30 milliGRAM(s) Oral daily  pantoprazole    Tablet 40 milliGRAM(s) Oral before breakfast  sodium chloride 0.9%. 1000 milliLiter(s) IV Continuous <Continuous>      HEALTH ISSUES - PROBLEM Dx:          Case Discussed with House Staff     Spectra [phone]

## 2019-12-08 NOTE — PROGRESS NOTE ADULT - ASSESSMENT
Patient is a 64y old  Male who presents with a chief complaint of Elevated Hgb (06 Dec 2019 17:23)    #Elevated hermoglobin secondary to suspicion of polycythemia vera  sp phlebotomy  discussed with hem onc dr tomlinson can be dc ed from their standpoint , however if not dced will arrange for phlembotomy in am     #Dysphagia   awaiting gi consult dr carpenter   esophogram ordered     #eosinophilic esophagitis ppi     #CKD 2     #DVT PPX    Progress Note Handoff    Pending: phlebotomy in am , awaiting esophogram and gi cs    Family discussion: patient is of sound mind and agrees to plan of care, also dw wife gil who is in agreement to plan of care    Disposition: Home___

## 2019-12-09 LAB
ANION GAP SERPL CALC-SCNC: 18 MMOL/L — HIGH (ref 7–14)
BUN SERPL-MCNC: 13 MG/DL — SIGNIFICANT CHANGE UP (ref 10–20)
CALCIUM SERPL-MCNC: 9.5 MG/DL — SIGNIFICANT CHANGE UP (ref 8.5–10.1)
CHLORIDE SERPL-SCNC: 101 MMOL/L — SIGNIFICANT CHANGE UP (ref 98–110)
CO2 SERPL-SCNC: 18 MMOL/L — SIGNIFICANT CHANGE UP (ref 17–32)
CREAT SERPL-MCNC: 1 MG/DL — SIGNIFICANT CHANGE UP (ref 0.7–1.5)
GLUCOSE SERPL-MCNC: 110 MG/DL — HIGH (ref 70–99)
HCT VFR BLD CALC: 54.4 % — HIGH (ref 42–52)
HGB BLD-MCNC: 18.3 G/DL — HIGH (ref 14–18)
MAGNESIUM SERPL-MCNC: 2 MG/DL — SIGNIFICANT CHANGE UP (ref 1.8–2.4)
MCHC RBC-ENTMCNC: 28.9 PG — SIGNIFICANT CHANGE UP (ref 27–31)
MCHC RBC-ENTMCNC: 33.6 G/DL — SIGNIFICANT CHANGE UP (ref 32–37)
MCV RBC AUTO: 85.8 FL — SIGNIFICANT CHANGE UP (ref 80–94)
NRBC # BLD: 0 /100 WBCS — SIGNIFICANT CHANGE UP (ref 0–0)
PLATELET # BLD AUTO: 157 K/UL — SIGNIFICANT CHANGE UP (ref 130–400)
POTASSIUM SERPL-MCNC: 4.2 MMOL/L — SIGNIFICANT CHANGE UP (ref 3.5–5)
POTASSIUM SERPL-SCNC: 4.2 MMOL/L — SIGNIFICANT CHANGE UP (ref 3.5–5)
RBC # BLD: 6.34 M/UL — HIGH (ref 4.7–6.1)
RBC # FLD: 12.5 % — SIGNIFICANT CHANGE UP (ref 11.5–14.5)
SODIUM SERPL-SCNC: 137 MMOL/L — SIGNIFICANT CHANGE UP (ref 135–146)
WBC # BLD: 9.31 K/UL — SIGNIFICANT CHANGE UP (ref 4.8–10.8)
WBC # FLD AUTO: 9.31 K/UL — SIGNIFICANT CHANGE UP (ref 4.8–10.8)

## 2019-12-09 PROCEDURE — 31575 DIAGNOSTIC LARYNGOSCOPY: CPT

## 2019-12-09 PROCEDURE — 99233 SBSQ HOSP IP/OBS HIGH 50: CPT

## 2019-12-09 PROCEDURE — 99255 IP/OBS CONSLTJ NEW/EST HI 80: CPT | Mod: 25

## 2019-12-09 RX ORDER — ALPRAZOLAM 0.25 MG
0.25 TABLET ORAL ONCE
Refills: 0 | Status: DISCONTINUED | OUTPATIENT
Start: 2019-12-09 | End: 2019-12-09

## 2019-12-09 RX ORDER — ALPRAZOLAM 0.25 MG
0.5 TABLET ORAL AT BEDTIME
Refills: 0 | Status: DISCONTINUED | OUTPATIENT
Start: 2019-12-09 | End: 2019-12-11

## 2019-12-09 RX ADMIN — Medication 0.25 MILLIGRAM(S): at 16:35

## 2019-12-09 RX ADMIN — ENOXAPARIN SODIUM 40 MILLIGRAM(S): 100 INJECTION SUBCUTANEOUS at 11:33

## 2019-12-09 RX ADMIN — Medication 0.5 MILLIGRAM(S): at 21:43

## 2019-12-09 RX ADMIN — PANTOPRAZOLE SODIUM 40 MILLIGRAM(S): 20 TABLET, DELAYED RELEASE ORAL at 06:02

## 2019-12-09 RX ADMIN — Medication 30 MILLIGRAM(S): at 05:36

## 2019-12-09 NOTE — PROGRESS NOTE ADULT - SUBJECTIVE AND OBJECTIVE BOX
SUBJECTIVE:    Patient is a 64y old Male who presents with a chief complaint of Elevated Hgb (08 Dec 2019 11:24)    Currently admitted to medicine with the primary diagnosis of Elevated hemoglobin     Today is hospital day 4d. This morning he is sitting comfortably in chair when seen and examined. This AM, complaining of vision blurriness (states he has been getting this recently but not in AM). Denies HA at present. Having regular BMs. Last phlebotomy session Friday evening (approximately 250 cc removed)    PAST MEDICAL & SURGICAL HISTORY  HTN (hypertension)  Eosinophilic esophagitis  Intestinal obstruction  Strangulated inguinal hernia    ALLERGIES:  No Known Allergies    MEDICATIONS:  STANDING MEDICATIONS  ALPRAZolam 0.25 milliGRAM(s) Oral at bedtime  enoxaparin Injectable 40 milliGRAM(s) SubCutaneous daily  mometasone 220 MICROgram(s) Inhaler 2 Puff(s) Inhalation two times a day  NIFEdipine XL 30 milliGRAM(s) Oral daily  pantoprazole    Tablet 40 milliGRAM(s) Oral before breakfast    PRN MEDICATIONS  acetaminophen   Tablet .. 650 milliGRAM(s) Oral every 6 hours PRN    VITALS:   T(F): 96.1  HR: 80  BP: 119/63  RR: 18  SpO2: --    LABS:                        18.3   9.31  )-----------( 157      ( 09 Dec 2019 10:11 )             54.4     12-09    137  |  101  |  13  ----------------------------<  110<H>  4.2   |  18  |  1.0    Ca    9.5      09 Dec 2019 10:11  Mg     2.0     12-09    RADIOLOGY:  No recent imaging    PHYSICAL EXAM:  GEN: No acute distress, anxious/tearful on exam at times  PULM/CHEST: Clear to auscultation bilaterally, no rales, rhonchi or wheezes   CVS: Regular rate and rhythm, S1-S2, no murmurs  ABD: Soft, non-tender, non-distended, +BS  EXT: No edema  NEURO: AAOx3

## 2019-12-09 NOTE — CONSULT NOTE ADULT - SUBJECTIVE AND OBJECTIVE BOX
65 yo male - well known to me from recent consultation re dysphagia - going on for over a month with 14 # weight loss.  Pt has had  w/u incudingt bA SWALLOW AND egd which failed to show any anatomical obstruction.  Bxs of the esophagus showed increased eosinophils but was only 15/hpf - not enough to calll eosinophillic esophagitis (> 50,000/hpf).  Next stp is a manometry study to r/o a motility disturbance.  Pt admitted now with polycythemia - but no increase in wbc or platelets.  Pt describes his dysphagia -as worse in the epigaastyrium and is associaterd with "dryness'
Patient is a 64y old  Male who presents with a chief complaint of Elevated Hgb (06 Dec 2019 15:13)      HPI:  63 y/o male with pMHx of HTN was sent in by his PMD Dr. Simon for evaluation of elevated Hgb. Pt reports he was having a headache the night before so he sent him at [facility] for blood work and was found to have hgb of 20. Pt says his father had a history of Polycythemia Vera. Pt admits to having a Headache, Dry mouth, intermittent fever/chills, intermittent itching, abdominal pain and generalized body aches started few days ago and progressively had gotten worse. Denies CP, SOB, nausea, vomiting, diarrhea, constipation and dysuria.     Pt also reports that he had EGD done recently for dysphagia. GE junction bx showed intraepithelial eosinophils and reflex esophagitis. says he is supposed to get another EGD.  ED vitals: /88, HR 89, RR 16, T 97.9F, sPO2 98% .      Hematology history :  Pt reports that he used to donate blood frequently in past and was never told about any abnormal blood results . Pt also reports using testosterone in past , quit using about 5 months ago . Reports that he feels headache with some flushing and pruritis especially after taking a hot shower .       PAST MEDICAL & SURGICAL HISTORY:  HTN (hypertension)  Eosinophilic esophagitis  Intestinal obstruction  Strangulated inguinal hernia      SOCIAL HISTORY: No h/o smoking tobacco  , smokes marijuana . no h/o drug or alcohol use .     FAMILY HISTORY:  Family history of bone cancer: mother; type not known.  Family history of heart disease: father  Family history of polycythemia vera: father    Allergies    No Known Allergies    Height (cm): 188 (12-06-19 @ 16:26)  Weight (kg): 99.3 (12-06-19 @ 16:26)  BMI (kg/m2): 28.1 (12-06-19 @ 16:26)  BSA (m2): 2.26 (12-06-19 @ 16:26)      HOME MEDICATIONS:  NIFEdipine 30 mg oral tablet, extended release: 1 tab(s) orally once a day (06 Dec 2019 00:18)  pantoprazole 40 mg oral delayed release tablet: 1 tab(s) orally once a day (06 Dec 2019 00:18)      Vital Signs Last 24 Hrs  T(C): 36 (06 Dec 2019 16:26), Max: 36.6 (05 Dec 2019 23:55)  T(F): 96.8 (06 Dec 2019 16:26), Max: 97.8 (05 Dec 2019 23:55)  HR: 77 (06 Dec 2019 16:26) (67 - 97)  BP: 134/78 (06 Dec 2019 16:26) (124/81 - 140/89)  BP(mean): --  RR: 18 (06 Dec 2019 16:26) (17 - 18)  SpO2: 100% (06 Dec 2019 09:44) (98% - 100%)    PHYSICAL EXAM  General: adult in NAD  HEENT: clear oropharynx, anicteric sclera, pink conjunctiva  CV: normal S1/S2 with no murmur rubs or gallops  Lungs: positive air movement b/l ant lungs,clear to auscultation, no wheezes, no rales  Abdomen: soft non-tender non-distended, no hepatosplenomegaly  Ext: no clubbing cyanosis or edema  Skin: no rashes and no petechiae  Neuro: alert and oriented X 4, no focal deficits    MEDICATIONS  (STANDING):  ALPRAZolam 0.25 milliGRAM(s) Oral at bedtime  chlorhexidine 4% Liquid 1 Application(s) Topical <User Schedule>  enoxaparin Injectable 40 milliGRAM(s) SubCutaneous daily  mometasone 220 MICROgram(s) Inhaler 2 Puff(s) Inhalation two times a day  NIFEdipine XL 30 milliGRAM(s) Oral daily  pantoprazole    Tablet 40 milliGRAM(s) Oral before breakfast  sodium chloride 0.9%. 1000 milliLiter(s) (75 mL/Hr) IV Continuous <Continuous>    MEDICATIONS  (PRN):  acetaminophen   Tablet .. 650 milliGRAM(s) Oral every 6 hours PRN Mild Pain (1 - 3), Moderate Pain (4 - 6)      LABS:                          18.5   8.26  )-----------( 143      ( 06 Dec 2019 05:36 )             55.8         Mean Cell Volume : 86.8 fL  Mean Cell Hemoglobin : 28.8 pg  Mean Cell Hemoglobin Concentration : 33.2 g/dL  Auto Neutrophil # : 4.65 K/uL  Auto Lymphocyte # : 2.52 K/uL  Auto Monocyte # : 0.69 K/uL  Auto Eosinophil # : 0.31 K/uL  Auto Basophil # : 0.06 K/uL  Auto Neutrophil % : 56.2 %  Auto Lymphocyte % : 30.5 %  Auto Monocyte % : 8.4 %  Auto Eosinophil % : 3.8 %  Auto Basophil % : 0.7 %      Serial CBC's  12-06 @ 05:36  Hct-55.8 / Hgb-18.5 / Plat-143 / RBC-6.43 / WBC-8.26  Serial CBC's  12-05 @ 18:24  Hct-55.4 / Hgb-18.5 / Plat-154 / RBC-6.38 / WBC-12.16      12-06    140  |  102  |  11  ----------------------------<  101<H>  4.1   |  23  |  1.1    Ca    9.3      06 Dec 2019 05:36  Mg     2.0     12-06    TPro  7.1  /  Alb  4.6  /  TBili  1.2  /  DBili  x   /  AST  21  /  ALT  23  /  AlkPhos  57  12-05      PT/INR - ( 05 Dec 2019 18:24 )   PT: 12.90 sec;   INR: 1.12 ratio         PTT - ( 05 Dec 2019 18:24 )  PTT:28.1 sec                            BLOOD SMEAR INTERPRETATION:       RADIOLOGY & ADDITIONAL STUDIES:
Pt is a 64 y.o male admitted with elevated Hgb - called to assess for oropharyngeal dysphagia. Pt seen and examined at bedside. Pt states he started having difficulties swallowing a while ago, had an EGD in the past x two showing eosinophils. PT the firs time did not follow up, this time he had an EGD by Dr Soria a few weeks ago, now in house for possible motility testing. PT states he has not been able to tolerate solid foods, can tolerate puree/liquids, still feels like its getting stuck lower down, but also admits to sensation of pain in his mouth as he eats. PT also states he feels very dry and is not producing mucus, eyes also dry. Pt states he has had a change in his voice, also worsens by the end of the day. Denies any SOB/diff breathing.     PAST MEDICAL & SURGICAL HISTORY:  HTN (hypertension)  Eosinophilic esophagitis  Intestinal obstruction  Strangulated inguinal hernia  MEDICATIONS  (STANDING):  ALPRAZolam 0.5 milliGRAM(s) Oral at bedtime  chlorhexidine 4% Liquid 1 Application(s) Topical <User Schedule>  enoxaparin Injectable 40 milliGRAM(s) SubCutaneous daily  mometasone 220 MICROgram(s) Inhaler 2 Puff(s) Inhalation two times a day  NIFEdipine XL 30 milliGRAM(s) Oral daily  pantoprazole    Tablet 40 milliGRAM(s) Oral before breakfast  Allergies    No Known Allergies    Intolerances    Vital Signs Last 24 Hrs  T(C): 36.4 (09 Dec 2019 12:20), Max: 36.5 (08 Dec 2019 21:32)  T(F): 97.6 (09 Dec 2019 12:20), Max: 97.7 (08 Dec 2019 21:32)  HR: 101 (09 Dec 2019 12:20) (80 - 101)  BP: 164/92 (09 Dec 2019 12:20) (119/63 - 164/92)  BP(mean): --  RR: 18 (09 Dec 2019 12:20) (18 - 18)  SpO2: --    GEN: NAD, awake and alert. no drooling or pooling of secretions, hoarse vocal quality.  HEENT: NC/AT: oral mucosa pink, no erythema/edema to post pharynx, uvula midline, neck supple.     Fiberoptic Laryngoscopy by attrayshawn, see below.                           18.3   9.31  )-----------( 157      ( 09 Dec 2019 10:11 )             54.4   12-09    137  |  101  |  13  ----------------------------<  110<H>  4.2   |  18  |  1.0    Ca    9.5      09 Dec 2019 10:11  Mg     2.0     12-09

## 2019-12-09 NOTE — CONSULT NOTE ADULT - ASSESSMENT
Dysphagia - unknown etiology - r/o motility lydia      REC;  Consult dR Lucho Yoo  for esophageal manometry  cONSIDER REPEAT egd
63 y/o male with pMHx of HTN was sent in by his PMD Dr. Simon for evaluation of elevated Hgb. Pt reports he was having a headache the night before so [place] [facility] [place] [facility] [place] and was found to have hgb of 20.   In ER here blood work showed Hb of 18.5 , review of labs showed Hb of 18 even in 2016 , but pt reports that he was never told about that .   Hematology was consulted for elevated Hb and for possibility on underlying Polycythemia.    # Elevated Hb from at least 2016 , could be Primary Vrs Secondary polycythemia ( use of testosterone )  Family h/o polycythemia   Us abd reviewed , no splenomegaly , Blood work not showing any thrombosis or elevated wbc . No c/o erythromelalgia .  - Mildly symptomatic having headaches , pruritis and flushing with Hot bath .   - SHER 2 and erythropoietin levels send by primary team . will f/u with results .  - Phelbotomy performed toady at bedside , as pt is having headaches . 250 ml of blood removed .   - Will arrange for out pt f/u and will asses for the need of repeating phelbotomies in future .  - Recommended to stop using testosterone .   - Cleared from heme-onc stand point to be discharged home and f/u as out pt. Spoke with wife demonstrates understanding .     #DYSPHAGIA :  - W/u as per GI , esophageal manometry .
Pt is a 64 y.o male with PCV, elevated Hgb - called for dysphagia    ·	nystatin swish and swallow  ·	CT neck with contrast to eval for R sided neck/ear pain  ·	Dr Elliott at bedside.

## 2019-12-09 NOTE — PROGRESS NOTE ADULT - ASSESSMENT
Patient is a 64y old  Male who presents with a chief complaint of Elevated Hgb (06 Dec 2019 17:23)    #Elevated hermoglobin secondary to suspicion of polycythemia vera  per dr alvarez not needed presently inpatient , patient requesting second opinion from dr bartlett   hemoglobin stable    #Dysphagia   per gi dr smith egd with manometry thursday , will send sjogren ab    #eosinophilic esophagitis ppi and momentasone     #CKD 2     #DVT PPX    Progress Note Handoff    Pending: gi - egd with manometry    Family discussion: patient is of sound mind and agrees to plan of care,    Disposition: Home___

## 2019-12-09 NOTE — PROGRESS NOTE ADULT - SUBJECTIVE AND OBJECTIVE BOX
Pts dysphagia seems better has now moved from distal esophageal to oropharyngeal - more on right side and now able to get  soft puddings  and liquids down.  This certainly speaks more for globus type of problems.  Am trying to get esophageal motility study done while in hosp.  In meantime pt is  maintaing weight -m c/o dryness in theroat.    Rec; Get Sjogren's ab  If can't do motility while in the hsp  do it as outpt this week

## 2019-12-09 NOTE — PROGRESS NOTE ADULT - SUBJECTIVE AND OBJECTIVE BOX
DARNELL DANIELS  64y  [facility], [place]      Patient is a 64y old  Male who presents with a chief complaint of Elevated Hgb (09 Dec 2019 11:21)      INTERVAL HPI/OVERNIGHT EVENTS:    no acute events overnight     REVIEW OF SYSTEMS:  CONSTITUTIONAL: No fever, weight loss, or fatigue  EYES: No eye pain, visual disturbances, or discharge  ENMT:  dry mouth and upper dysphagia   NECK: No pain or stiffness  BREASTS: No pain, masses, or nipple discharge  RESPIRATORY: No cough, wheezing, chills or hemoptysis; No shortness of breath  CARDIOVASCULAR: No chest pain, palpitations, dizziness, or leg swelling  GASTROINTESTINAL: No abdominal or epigastric pain. No nausea, vomiting, or hematemesis; No diarrhea or constipation. No melena or hematochezia.  GENITOURINARY: No dysuria, frequency, hematuria, or incontinence  NEUROLOGICAL: No headaches, memory loss, loss of strength, numbness, or tremors  SKIN: No itching, burning, rashes, or lesions   LYMPH NODES: No enlarged glands  ENDOCRINE: No heat or cold intolerance; No hair loss  MUSCULOSKELETAL: No joint pain or swelling; No muscle, back, or extremity pain  PSYCHIATRIC: No depression, anxiety, mood swings, or difficulty sleeping  HEME/LYMPH: No easy bruising, or bleeding gums  ALLERY AND IMMUNOLOGIC: No hives or eczema  FAMILY HISTORY:  Family history of bone cancer: mother; type not known.  Family history of heart disease: father  Family history of polycythemia vera: father    T(C): 36.4 (12-09-19 @ 12:20), Max: 36.5 (12-08-19 @ 21:32)  HR: 101 (12-09-19 @ 12:20) (80 - 101)  BP: 164/92 (12-09-19 @ 12:20) (119/63 - 164/92)  RR: 18 (12-09-19 @ 12:20) (18 - 18)  SpO2: --  Wt(kg): --Vital Signs Last 24 Hrs  T(C): 36.4 (09 Dec 2019 12:20), Max: 36.5 (08 Dec 2019 21:32)  T(F): 97.6 (09 Dec 2019 12:20), Max: 97.7 (08 Dec 2019 21:32)  HR: 101 (09 Dec 2019 12:20) (80 - 101)  BP: 164/92 (09 Dec 2019 12:20) (119/63 - 164/92)  BP(mean): --  RR: 18 (09 Dec 2019 12:20) (18 - 18)  SpO2: --    PHYSICAL EXAM:  GENERAL: NAD, well-groomed, well-developed  HEAD:  Atraumatic, Normocephalic  EYES: EOMI, PERRLA, conjunctiva and sclera clear  ENMT: No tonsillar erythema, exudates, or enlargement; Moist mucous membranes, Good dentition, No lesions  NECK: Supple, No JVD, Normal thyroid  NERVOUS SYSTEM:  Alert & Oriented X3, Good concentration; Motor Strength 5/5 B/L upper and lower extremities; DTRs 2+ intact and symmetric  PULM: Clear to auscultation bilaterally  CARDIAC: Regular rate and rhythm; No murmurs, rubs, or gallops  GI: Soft, Nontender, Nondistended; Bowel sounds present  EXTREMITIES:  2+ Peripheral Pulses, No clubbing, cyanosis, or edema  LYMPH: No lymphadenopathy noted  SKIN: No rashes or lesions    Consultant(s) Notes Reviewed:  [x ] YES  [ ] NO  Care Discussed with Consultants/Other Providers [ x] YES  [ ] NO    LABS:                            18.3   9.31  )-----------( 157      ( 09 Dec 2019 10:11 )             54.4   12-09    137  |  101  |  13  ----------------------------<  110<H>  4.2   |  18  |  1.0    Ca    9.5      09 Dec 2019 10:11  Mg     2.0     12-09              acetaminophen   Tablet .. 650 milliGRAM(s) Oral every 6 hours PRN  ALPRAZolam 0.5 milliGRAM(s) Oral at bedtime  chlorhexidine 4% Liquid 1 Application(s) Topical <User Schedule>  enoxaparin Injectable 40 milliGRAM(s) SubCutaneous daily  mometasone 220 MICROgram(s) Inhaler 2 Puff(s) Inhalation two times a day  NIFEdipine XL 30 milliGRAM(s) Oral daily  pantoprazole    Tablet 40 milliGRAM(s) Oral before breakfast      HEALTH ISSUES - PROBLEM Dx:          Case Discussed with House Staff   .   Spectra [phone]

## 2019-12-09 NOTE — PROGRESS NOTE ADULT - ASSESSMENT
64 year-old male with PMH of HTN, and strongyloides was sent in by his PMD, Dr. Simon, for evaluation of elevated Hgb    # Polycythemia, r/o primary vs. 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 Patient has + family history of polycythemia vera in father c/b CVA  - No splenomegaly visualized on abdominal US. No thrombosis or leukocytosis. No c/o erythromelalgia. Mildly symptomatic having HA and pruritis/flushing with hot bath/shower  - EPO level decreased (2.3) from 12/6  - F/u JAK2 mutation labs (sent 12/9)  - Had phlebotomy session 12/6 with removal of 250 cc's blood. Discussed with heme/onc- will undergo additional phlebotomy session 12/9. Will need outpatient follow-up with Heme/Onc    # Dysphagia, esophageal vs. oropharyngeal  - Reports difficulty swallowing solids since over a month ago, which prompted outpatient workup. No difficulty swallowing liquids, however states there is a sensation the solid food gets stuck in his mouth when swallowing  - Also c/o dryness in mouth. Labs from [facility] reviewed. Negative: syphilis, strongyloides, CCP IgG, anti-SSA & B, intrinsic factor  - GI (Dr. Soria) following. Per GI, attempting to have motility study while inpatient. Will f/u further recs  - Underwent esophagram and EGD in November as outpatient. Per GI, esophagram negative at that time. EGD revealed normal mucosa throughout  - Speech and swallow eval noted from admission. Called again 12/9 as patient denying evaluation was completed    # HTN, well-controlled  - Continue Procardia 20 mg qD    # History of strongyloides  - Patient describes being diagnosed in past after working at [employer] post 9/11  - Was treated as outpatient in past. Strongyloides antibody negative on recent labs  - Eosinophils not elevated on CBC  - Patient reports being told there were numerous eosinophils visualized on prior EGD, but not enough to make diagnosis of EOE. Unclear if this is related    #DVT ppx: Lovenox 40 mg subcutaneous daily  #GI ppx: Protonix  #Diet: Dysphagia 3, soft, nectar, DASH  #Activity: ambulating  #Dispo: from home, pending motility testing possibly inpatient  #Code status: FULL CODE  #Follow-up: ____Phlebotomy today, __GI recs for inpatient motility _________ 64 year-old male with PMH of HTN, and strongyloides was sent in by his PMD, Dr. Simon, for evaluation of elevated Hgb    # Polycythemia, r/o primary vs. 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 Patient has + family history of polycythemia vera in father c/b CVA  - No splenomegaly visualized on abdominal US. No thrombosis or leukocytosis. No c/o erythromelalgia. Mildly symptomatic having HA and pruritis/flushing with hot bath/shower  - EPO level decreased (2.3) from 12/6  - F/u JAK2 mutation labs (sent 12/9)  - Had phlebotomy session 12/6 with removal of 250 cc's blood. Discussed with heme/onc- no additional phlebotomy session 12/9. Will need outpatient follow-up with Heme/Onc    # Dysphagia, esophageal vs. oropharyngeal  - Reports difficulty swallowing solids since over a month ago, which prompted outpatient workup. No difficulty swallowing liquids, however states there is a sensation the solid food gets stuck in his mouth when swallowing  - Also c/o dryness in mouth. Labs from [facility] reviewed. Negative: syphilis, strongyloides, CCP IgG, anti-SSA & B, intrinsic factor  - GI (Dr. Soria) following. Per GI, attempting to have motility study while inpatient. Will f/u further recs  - Underwent esophagram and EGD in November as outpatient. Per GI, esophagram negative at that time. EGD revealed normal mucosa throughout  - Speech and swallow eval noted from admission. Called again 12/9 as patient denying evaluation was completed    # HTN, well-controlled  - Continue Procardia 20 mg qD    # History of strongyloides  - Patient describes being diagnosed in past after working at [employer] post 9/11  - Was treated as outpatient in past. Strongyloides antibody negative on recent labs  - Eosinophils not elevated on CBC  - Patient reports being told there were numerous eosinophils visualized on prior EGD, but not enough to make diagnosis of EOE. Unclear if this is related    #DVT ppx: Lovenox 40 mg subcutaneous daily  #GI ppx: Protonix  #Diet: Dysphagia 3, soft, nectar, DASH  #Activity: ambulating  #Dispo: from home, pending motility testing possibly inpatient  #Code status: FULL CODE  #Follow-up: _____GI recs for inpatient motility _________ 64 year-old male with PMH of HTN, and strongyloides was sent in by his PMD, Dr. Simon, for evaluation of elevated Hgb    # Polycythemia, r/o primary vs. 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 Patient has + family history of polycythemia vera in father c/b CVA  - No splenomegaly visualized on abdominal US. No thrombosis or leukocytosis. No c/o erythromelalgia. Mildly symptomatic having HA and pruritis/flushing with hot bath/shower  - EPO level decreased (2.3) from 12/6  - F/u JAK2 mutation labs (sent 12/9)  - Had phlebotomy session 12/6 with removal of 250 cc's blood. Discussed with heme/onc- no additional phlebotomy session 12/9. Will need outpatient follow-up with Heme/Onc  - Per Heme/onc no further phlebotomy sessions needed. Patient and wife extremely irate at this news, demanding second opinion. Stated would place consult for Dr. Li (Hematology) for second opinion as he is known to patient's family    # Dysphagia, esophageal vs. oropharyngeal  - Reports difficulty swallowing solids since over a month ago, which prompted outpatient workup. No difficulty swallowing liquids, however states there is a sensation the solid food gets stuck in his mouth when swallowing  - Also c/o dryness in mouth. Labs from [facility] reviewed. Negative: syphilis, strongyloides, CCP IgG, anti-SSA & B, intrinsic factor  - GI (Dr. Soria) following. Per GI, attempting to have motility study while inpatient. Will f/u further recs  - Underwent esophagram and EGD in November as outpatient. Per GI, esophagram negative at that time. EGD revealed normal mucosa throughout  - Speech and swallow eval noted from admission. Called again 12/9 as patient denying evaluation was completed  - ENT consulted per patient request 12/9. Will f/u recs    # HTN, well-controlled  - Continue Procardia 20 mg qD    # History of strongyloides  - Patient describes being diagnosed in past after working at [employer] post 9/11  - Was treated as outpatient in past. Strongyloides antibody negative on recent labs  - Eosinophils not elevated on CBC  - Patient reports being told there were numerous eosinophils visualized on prior EGD, but not enough to make diagnosis of EOE. Unclear if this is related    #DVT ppx: Lovenox 40 mg subcutaneous daily  #GI ppx: Protonix  #Diet: Dysphagia 3, soft, nectar, DASH  #Activity: ambulating  #Dispo: from home, pending motility testing possibly inpatient  #Code status: FULL CODE  #Follow-up: _____GI recs for inpatient motility _________ 64 year-old male with PMH of HTN, and strongyloides was sent in by his PMD, Dr. Simon, for evaluation of elevated Hgb    # Polycythemia, r/o primary vs. 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 Patient has + family history of polycythemia vera in father c/b CVA  - No splenomegaly visualized on abdominal US. No thrombosis or leukocytosis. No c/o erythromelalgia. Mildly symptomatic having HA and pruritis/flushing with hot bath/shower  - EPO level decreased (2.3) from 12/6  - F/u JAK2 mutation labs (sent 12/9)  - Had phlebotomy session 12/6 with removal of 250 cc's blood. Discussed with heme/onc- no additional phlebotomy session 12/9. Will need outpatient follow-up with Heme/Onc  - Per Heme/onc no further phlebotomy sessions needed. Patient and wife extremely irate at this news, demanding second opinion. Stated would place consult for Dr. Li (Hematology) for second opinion as he is known to patient's family    # Dysphagia, esophageal vs. oropharyngeal  - Reports difficulty swallowing solids since over a month ago, which prompted outpatient workup. No difficulty swallowing liquids, however states there is a sensation the solid food gets stuck in his mouth when swallowing  - Also c/o dryness in mouth. Labs from [facility] reviewed. Negative: syphilis, strongyloides, CCP IgG, anti-SSA & B, intrinsic factor  - GI (Dr. Soria) following. Per GI, attempting to have motility study while inpatient. Will f/u further recs  - Underwent esophagram and EGD in November as outpatient. Per GI, esophagram negative at that time. EGD revealed normal mucosa throughout  - Speech and swallow eval noted from admission. Called again 12/9 as patient denying evaluation was completed  - ENT consulted per patient request 12/9. Will f/u recs    # HTN, well-controlled  - Continue Procardia 20 mg qD    # History of strongyloides  - Patient describes being diagnosed in past after working at [employer] post 9/11  - Was treated as outpatient in past. Strongyloides antibody negative on recent labs  - Eosinophils not elevated on CBC  - Patient reports being told there were numerous eosinophils visualized on prior EGD, but not enough to make diagnosis of EOE. Unclear if this is related    #DVT ppx: Lovenox 40 mg subcutaneous daily  #GI ppx: Protonix  #Diet: Dysphagia 3, soft, nectar, DASH  #Activity: ambulating  #Dispo: from home, pending motility testing possibly inpatient  #Code status: FULL CODE  #Follow-up: _____GI recs for inpatient motility, ENT consult, Heme/Onc second opinion _________ 64 year-old male with PMH of HTN, and strongyloides was sent in by his PMD, Dr. Simon, for evaluation of elevated Hgb    # Polycythemia, r/o primary vs. 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 Patient has + family history of polycythemia vera in father c/b CVA  - No splenomegaly visualized on abdominal US. No thrombosis or leukocytosis. No c/o erythromelalgia. Mildly symptomatic having HA and pruritis/flushing with hot bath/shower  - EPO level decreased (2.3) from 12/6  - F/u JAK2 mutation labs (sent 12/9)  - Had phlebotomy session 12/6 with removal of 250 cc's blood. Discussed with heme/onc- no additional phlebotomy session 12/9. Will need outpatient follow-up with Heme/Onc  - Per Heme/onc no further phlebotomy sessions needed. Patient and wife extremely irate at this news, demanding second opinion. Stated would place consult for Dr. Li (Hematology) for second opinion as he is known to patient's family    # Dysphagia, esophageal vs. oropharyngeal  - Reports difficulty swallowing solids since over a month ago, which prompted outpatient workup. No difficulty swallowing liquids, however states there is a sensation the solid food gets stuck in his mouth when swallowing  - Also c/o dryness in mouth. Labs from [facility] reviewed. Negative: syphilis, strongyloides, CCP IgG, anti-SSA & B, intrinsic factor  - GI (Dr. Soria) following. Per GI, attempting to have motility study while inpatient. Will f/u further recs  - Underwent esophagram and EGD in November as outpatient. Per GI, esophagram negative at that time. EGD revealed normal mucosa throughout. Per GI, for inpatient manometry testing Thursday  - Speech and swallow eval noted from admission. Called again 12/9 as patient denying evaluation was completed  - ENT consulted per patient request 12/9. Will f/u recs    # HTN, well-controlled  - Continue Procardia 20 mg qD    # History of strongyloides  - Patient describes being diagnosed in past after working at [employer] post 9/11  - Was treated as outpatient in past. Strongyloides antibody negative on recent labs  - Eosinophils not elevated on CBC  - Patient reports being told there were numerous eosinophils visualized on prior EGD, but not enough to make diagnosis of EOE. Unclear if this is related    #DVT ppx: Lovenox 40 mg subcutaneous daily  #GI ppx: Protonix  #Diet: Dysphagia 3, soft, nectar, DASH  #Activity: ambulating  #Dispo: from home, pending motility testing possibly inpatient  #Code status: FULL CODE  #Follow-up: _____GI recs for inpatient motility, ENT consult, Heme/Onc second opinion _________

## 2019-12-09 NOTE — CONSULT NOTE ADULT - ATTENDING COMMENTS
ALIA Soria
I have seen and examined the patient with DIONNE REAVES.  Endoscopic exam performed by me shows patchy white secretions on larynx, possible yeast related to steroid inhaler. Recommend Nystatin swish and SWALLOW.    Also recommend CT neck with contrast regarding right neck pain radiating to the ear.    Recommend Anti-ssa and anti-ssb antibodies to r/o Sjogren's.
Patient was seen and examined. We were asked to see the patient by the primary team to elevated hemoglobin, 18.5. He has been having some headaches. He states he has been on testosterone replacement, but did stop this several months back. His white blood count was normal and so was platelets. He only smokes occasional marijuana does not use tobacco products. He did have an ultrasound of his abdomen. There was no kidney or liver masses.    Impression:  #Erythrocytosis, primary versus secondary, may have been due to testosterone replacement therapy, but will rule out essential thrombocytosis    Plan:  -Please check Jack2 and EPO level  -We will phlebotomize today since he is having  headaches  -He will followup as outpatient and will proceed from there. He may require additional testing such as sleep apnea depending on the results of the above tests.    Thank you for the consultation. We will arrange followup in a few weeks as outpatient.

## 2019-12-10 LAB
ANION GAP SERPL CALC-SCNC: 16 MMOL/L — HIGH (ref 7–14)
BUN SERPL-MCNC: 12 MG/DL — SIGNIFICANT CHANGE UP (ref 10–20)
CALCIUM SERPL-MCNC: 9 MG/DL — SIGNIFICANT CHANGE UP (ref 8.5–10.1)
CHLORIDE SERPL-SCNC: 103 MMOL/L — SIGNIFICANT CHANGE UP (ref 98–110)
CO2 SERPL-SCNC: 22 MMOL/L — SIGNIFICANT CHANGE UP (ref 17–32)
CREAT SERPL-MCNC: 1 MG/DL — SIGNIFICANT CHANGE UP (ref 0.7–1.5)
GLUCOSE SERPL-MCNC: 107 MG/DL — HIGH (ref 70–99)
HCT VFR BLD CALC: 51.1 % — SIGNIFICANT CHANGE UP (ref 42–52)
HGB BLD-MCNC: 17.2 G/DL — SIGNIFICANT CHANGE UP (ref 14–18)
MCHC RBC-ENTMCNC: 28.9 PG — SIGNIFICANT CHANGE UP (ref 27–31)
MCHC RBC-ENTMCNC: 33.7 G/DL — SIGNIFICANT CHANGE UP (ref 32–37)
MCV RBC AUTO: 85.7 FL — SIGNIFICANT CHANGE UP (ref 80–94)
NRBC # BLD: 0 /100 WBCS — SIGNIFICANT CHANGE UP (ref 0–0)
PLATELET # BLD AUTO: 131 K/UL — SIGNIFICANT CHANGE UP (ref 130–400)
POTASSIUM SERPL-MCNC: 3.7 MMOL/L — SIGNIFICANT CHANGE UP (ref 3.5–5)
POTASSIUM SERPL-SCNC: 3.7 MMOL/L — SIGNIFICANT CHANGE UP (ref 3.5–5)
RBC # BLD: 5.96 M/UL — SIGNIFICANT CHANGE UP (ref 4.7–6.1)
RBC # FLD: 12.6 % — SIGNIFICANT CHANGE UP (ref 11.5–14.5)
SODIUM SERPL-SCNC: 141 MMOL/L — SIGNIFICANT CHANGE UP (ref 135–146)
WBC # BLD: 6.56 K/UL — SIGNIFICANT CHANGE UP (ref 4.8–10.8)
WBC # FLD AUTO: 6.56 K/UL — SIGNIFICANT CHANGE UP (ref 4.8–10.8)

## 2019-12-10 PROCEDURE — 70491 CT SOFT TISSUE NECK W/DYE: CPT | Mod: 26

## 2019-12-10 PROCEDURE — 99233 SBSQ HOSP IP/OBS HIGH 50: CPT

## 2019-12-10 RX ORDER — NYSTATIN 500MM UNIT
500000 POWDER (EA) MISCELLANEOUS
Refills: 0 | Status: DISCONTINUED | OUTPATIENT
Start: 2019-12-10 | End: 2019-12-11

## 2019-12-10 RX ADMIN — PANTOPRAZOLE SODIUM 40 MILLIGRAM(S): 20 TABLET, DELAYED RELEASE ORAL at 16:17

## 2019-12-10 RX ADMIN — MOMETASONE FUROATE 2 PUFF(S): 220 INHALANT RESPIRATORY (INHALATION) at 21:20

## 2019-12-10 RX ADMIN — Medication 0.5 MILLIGRAM(S): at 21:20

## 2019-12-10 RX ADMIN — ENOXAPARIN SODIUM 40 MILLIGRAM(S): 100 INJECTION SUBCUTANEOUS at 17:44

## 2019-12-10 RX ADMIN — Medication 500000 UNIT(S): at 17:43

## 2019-12-10 RX ADMIN — Medication 30 MILLIGRAM(S): at 05:23

## 2019-12-10 RX ADMIN — Medication 0.25 MILLIGRAM(S): at 16:16

## 2019-12-10 NOTE — PROGRESS NOTE ADULT - ASSESSMENT
64 year-old male with PMH of HTN, and strongyloides was sent in by his PMD, Dr. Simon, for evaluation of elevated Hgb    # Polycythemia, r/o primary vs. 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 Patient has + family history of polycythemia vera in father c/b CVA. - Had phlebotomy session 12/6 with removal of 250 cc's blood. Discussed with heme/onc- no additional phlebotomy session 12/9. Will need outpatient follow-up with Heme/Onc   - No splenomegaly visualized on abdominal US. No thrombosis or leukocytosis. No c/o erythromelalgia. Mildly symptomatic having HA and pruritis/flushing with hot bath/shower. Also complaining of vision blurriness periodically  - EPO level decreased (2.3) from 12/6  - F/u JAK2 mutation labs (sent 12/9)  - Per Heme/onc no further phlebotomy sessions needed. Consult placed for Dr. Li (Hematology) for second opinion as he is known to patient's family. Will f/u recs    # Dysphagia, esophageal vs. oropharyngeal  - Reports difficulty swallowing solids since over a month ago, which prompted outpatient workup. No difficulty swallowing liquids, however states there is a sensation the solid food gets stuck in his mouth when swallowing  - Also c/o dryness in mouth. Labs from [facility] reviewed. Negative: syphilis, strongyloides, CCP IgG, anti-SSA & B, intrinsic factor  - GI (Dr. Soria) following. Per GI, inpatient motility study 12/12  - Underwent esophagram and EGD in November as outpatient. Per GI, esophagram negative at that time. EGD revealed normal mucosa throughout  - Speech and swallow eval noted from admission. Called again 12/9 as patient denying evaluation was completed. Discussed with s+s- patient may benefit from outpatient modified barium swallow given symptoms  - ENT consulted per patient request 12/9. ENT performed endoscopic exam revealing patchy white secretions on larynx, possibly 2/2 yeast infection related to steroid inhaler use; recommended Nystatin swish/swallow. Also recommended CT neck with IV contrast to evaluate right-sided neck pain radiating to ear     # HTN, well-controlled  - Continue Procardia 20 mg qD    # History of strongyloides  - Patient describes being diagnosed in past after working at [employer] site post 9/11  - Was treated as outpatient in past. Strongyloides antibody negative on recent labs  - Eosinophils not elevated on CBC  - Patient reports being told there were numerous eosinophils visualized on prior EGD, but not enough to make diagnosis of EOE. Unclear if this is related    #DVT ppx: Lovenox 40 mg subcutaneous daily  #GI ppx: Protonix  #Diet: Dysphagia 3, soft, nectar, DASH  #Activity: ambulating  #Dispo: from home, pending further workup  #Code status: FULL CODE  #Follow-up: _____GI recs for inpatient motility, Heme/Onc second opinion, CT neck _______

## 2019-12-10 NOTE — PROGRESS NOTE ADULT - SUBJECTIVE AND OBJECTIVE BOX
DARNELL DANIELS  64y  [facility] [address] [address]      Patient is a 64y old  Male who presents with a chief complaint of Elevated Hgb (10 Dec 2019 08:07)      INTERVAL HPI/OVERNIGHT EVENTS:  no acute events overnight      REVIEW OF SYSTEMS:  CONSTITUTIONAL: No fever, weight loss, or fatigue  EYES reports times of blurry vision  or throat pain  NECK: No pain or stiffness  BREASTS: No pain, masses, or nipple discharge  RESPIRATORY: No cough, wheezing, chills or hemoptysis; No shortness of breath  CARDIOVASCULAR: No chest pain, palpitations, dizziness, or leg swelling  GASTROINTESTINAL: No abdominal or epigastric pain. No nausea, vomiting, or hematemesis; No diarrhea or constipation. No melena or hematochezia.  GENITOURINARY: No dysuria, frequency, hematuria, or incontinence  NEUROLOGICAL: No headaches, memory loss, loss of strength, numbness, or tremors  SKIN: No itching, burning, rashes, or lesions   LYMPH NODES: No enlarged glands  ENDOCRINE: No heat or cold intolerance; No hair loss  MUSCULOSKELETAL: No joint pain or swelling; No muscle, back, or extremity pain  PSYCHIATRIC: No depression, anxiety, mood swings, or difficulty sleeping  HEME/LYMPH: No easy bruising, or bleeding gums  ALLERY AND IMMUNOLOGIC: No hives or eczema  FAMILY HISTORY:  Family history of bone cancer: mother; type not known.  Family history of heart disease: father  Family history of polycythemia vera: father    T(C): 35.9 (12-10-19 @ 12:43), Max: 36.2 (12-10-19 @ 04:30)  HR: 78 (12-10-19 @ 12:43) (78 - 95)  BP: 117/72 (12-10-19 @ 12:43) (117/72 - 152/82)  RR: 20 (12-10-19 @ 12:43) (18 - 20)  SpO2: --  Wt(kg): --Vital Signs Last 24 Hrs  T(C): 35.9 (10 Dec 2019 12:43), Max: 36.2 (10 Dec 2019 04:30)  T(F): 96.7 (10 Dec 2019 12:43), Max: 97.2 (10 Dec 2019 04:30)  HR: 78 (10 Dec 2019 12:43) (78 - 95)  BP: 117/72 (10 Dec 2019 12:43) (117/72 - 152/82)  BP(mean): --  RR: 20 (10 Dec 2019 12:43) (18 - 20)  SpO2: --    PHYSICAL EXAM:  GENERAL: NAD, well-groomed, well-developed  HEAD:  Atraumatic, Normocephalic  EYES: EOMI, PERRLA, conjunctiva and sclera clear  ENMT: No tonsillar erythema, exudates, or enlargement; Moist mucous membranes, Good dentition, No lesions  NECK: Supple, No JVD, Normal thyroid  NERVOUS SYSTEM:  Alert & Oriented X3, Good concentration; Motor Strength 5/5 B/L upper and lower extremities; DTRs 2+ intact and symmetric  PULM: Clear to auscultation bilaterally  CARDIAC: Regular rate and rhythm; No murmurs, rubs, or gallops  GI: Soft, Nontender, Nondistended; Bowel sounds present  EXTREMITIES:  2+ Peripheral Pulses, No clubbing, cyanosis, or edema  LYMPH: No lymphadenopathy noted  SKIN: No rashes or lesions    Consultant(s) Notes Reviewed:  [x ] YES  [ ] NO  Care Discussed with Consultants/Other Providers [ x] YES  [ ] NO    LABS:                            17.2   6.56  )-----------( 131      ( 10 Dec 2019 05:59 )             51.1   12-10    141  |  103  |  12  ----------------------------<  107<H>  3.7   |  22  |  1.0    Ca    9.0      10 Dec 2019 05:59  Mg     2.0     12-09              acetaminophen   Tablet .. 650 milliGRAM(s) Oral every 6 hours PRN  ALPRAZolam 0.5 milliGRAM(s) Oral at bedtime  chlorhexidine 4% Liquid 1 Application(s) Topical <User Schedule>  enoxaparin Injectable 40 milliGRAM(s) SubCutaneous daily  mometasone 220 MICROgram(s) Inhaler 2 Puff(s) Inhalation two times a day  NIFEdipine XL 30 milliGRAM(s) Oral daily  nystatin    Suspension 901505 Unit(s) Oral two times a day  pantoprazole    Tablet 40 milliGRAM(s) Oral before breakfast      HEALTH ISSUES - PROBLEM Dx:          Case Discussed with House Staff   .   Spectra [phone]

## 2019-12-10 NOTE — PROGRESS NOTE ADULT - SUBJECTIVE AND OBJECTIVE BOX
SUBJECTIVE:    Patient is a 64y old Male who presents with a chief complaint of Elevated Hgb (09 Dec 2019 18:35)    Currently admitted to medicine with the primary diagnosis of Elevated hemoglobin     Today is hospital day 5d. This morning he is walking in room when seen and examined. Discussed plan for today and ENT evaluation done last night. Patient agreeable with plan and aware of consult placed for second-opinion from Hematology later today.    PAST MEDICAL & SURGICAL HISTORY  HTN (hypertension)  Eosinophilic esophagitis  Intestinal obstruction  Strangulated inguinal hernia    ALLERGIES:  No Known Allergies    MEDICATIONS:  STANDING MEDICATIONS  ALPRAZolam 0.5 milliGRAM(s) Oral at bedtime  enoxaparin Injectable 40 milliGRAM(s) SubCutaneous daily  mometasone 220 MICROgram(s) Inhaler 2 Puff(s) Inhalation two times a day  NIFEdipine XL 30 milliGRAM(s) Oral daily  pantoprazole    Tablet 40 milliGRAM(s) Oral before breakfast    PRN MEDICATIONS  acetaminophen   Tablet .. 650 milliGRAM(s) Oral every 6 hours PRN    VITALS:   T(F): 97.2  HR: 95  BP: 152/82  RR: 18  SpO2: --    LABS:                        17.2   6.56  )-----------( 131      ( 10 Dec 2019 05:59 )             51.1     12-09    137  |  101  |  13  ----------------------------<  110<H>  4.2   |  18  |  1.0    Ca    9.5      09 Dec 2019 10:11  Mg     2.0     12-09    RADIOLOGY:  No recent imaging    PHYSICAL EXAM:  GEN: No acute distress, anxious  PULM/CHEST: Clear to auscultation bilaterally, no rales, rhonchi or wheezes   CVS: Regular rate and rhythm, S1-S2, no murmurs  ABD: Soft, non-tender, non-distended, +BS  EXT: No edema  NEURO: AAOx3

## 2019-12-10 NOTE — PROGRESS NOTE ADULT - SUBJECTIVE AND OBJECTIVE BOX
HPI:  63 y/o male with pMHx of HTN was sent in by his PMD Dr. Simon for evaluation of elevated Hgb. Pt reports he was having a headache the night before so he sent him at [facility] for blood work and was found to have hgb of 20. Pt says his father had a history of Polycythemia Vera. Pt admits to having a Headache, Dry mouth, intermittent fever/chills, intermittent itching, abdominal pain and generalized body aches started few days ago and progressively had gotten worse. Denies CP, SOB, nausea, vomiting, diarrhea, constipation and dysuria.     Pt also reports that he had EGD done recently for dysphagia. GE junction bx showed intraepithelial eosinophils and reflex esophagitis. says he is supposed to get another EGD.  Still has dysphagia        PAST MEDICAL & SURGICAL HISTORY:  HTN (hypertension)  Eosinophilic esophagitis  Intestinal obstruction  Strangulated inguinal hernia      REVIEW OF SYSTEMS:    CONSTITUTIONAL: No weakness, fevers or chills  EYES/ENT: No visual changes;  No vertigo or throat pain   NECK: No pain or stiffness  RESPIRATORY: No cough, wheezing, hemoptysis; No shortness of breath  CARDIOVASCULAR: No chest pain or palpitations  GASTROINTESTINAL: No abdominal or epigastric pain. No nausea, vomiting, or hematemesis; DYSPHAGIA   GENITOURINARY: No dysuria, frequency or hematuria  NEUROLOGICAL: No numbness or weakness  SKIN: No itching, rashes      MEDICATIONS  (STANDING):  ALPRAZolam 0.5 milliGRAM(s) Oral at bedtime  chlorhexidine 4% Liquid 1 Application(s) Topical <User Schedule>  enoxaparin Injectable 40 milliGRAM(s) SubCutaneous daily  mometasone 220 MICROgram(s) Inhaler 2 Puff(s) Inhalation two times a day  NIFEdipine XL 30 milliGRAM(s) Oral daily  nystatin    Suspension 504662 Unit(s) Oral two times a day  pantoprazole    Tablet 40 milliGRAM(s) Oral before breakfast    MEDICATIONS  (PRN):  acetaminophen   Tablet .. 650 milliGRAM(s) Oral every 6 hours PRN Mild Pain (1 - 3), Moderate Pain (4 - 6)      Allergies    No Known Allergies    Intolerances        SOCIAL HISTORY:    FAMILY HISTORY:  Family history of bone cancer: mother; type not known.  Family history of heart disease: father  Family history of polycythemia vera: father      Vital Signs Last 24 Hrs  T(C): 35.9 (10 Dec 2019 12:43), Max: 36.2 (10 Dec 2019 04:30)  T(F): 96.7 (10 Dec 2019 12:43), Max: 97.2 (10 Dec 2019 04:30)  HR: 78 (10 Dec 2019 12:43) (78 - 95)  BP: 117/72 (10 Dec 2019 12:43) (117/72 - 152/82)  BP(mean): --  RR: 20 (10 Dec 2019 12:43) (18 - 20)  SpO2: --    PHYSICAL EXAM:  GENERAL: NAD, well-developed, well nourished, looks stated age  HEAD:  Atraumatic, Normocephalic  EYES: EOMI, PERRLA, conjunctiva and sclera clear  NECK: Supple, No JVD, no bruits, no masses, no thyroid enlargement  ENMT: No tonsillar erythema, exudated or enlargement, moist mucous membranes  CHEST/LUNG: Clear to auscultation bilaterally; No rales, rhonchi or wheezing, no dullness to percussion  HEART: S1,S2 Regular rate and rhythm; No murmurs, rubs, or gallops  ABDOMEN: Soft, nontender, nondistended, no rebound tenderness; No palpable masses, no hernias, no organomegaly; Bowel sounds present and normoactive  EXTREMITIES:  2+ peripheral pulses bilaterally and symmetrically, no clubbing, cyanosis, or edema  LYMPH: No lymphadenopathy  PSYCH: AAOx3, normal mood and affect  NEUROLOGY: non-focal, muscle strength 5/5 all extremities, DTRs 2+ symmetrically  SKIN: No rashes or lesions    LABS:                        17.2   6.56  )-----------( 131      ( 10 Dec 2019 05:59 )             51.1     12-10    141  |  103  |  12  ----------------------------<  107<H>  3.7   |  22  |  1.0    Ca    9.0      10 Dec 2019 05:59  Mg     2.0     12-09            RADIOLOGY & ADDITIONAL STUDIES:

## 2019-12-10 NOTE — PROGRESS NOTE ADULT - ASSESSMENT
Dysphagia, intermittent    EGD with dilation    PLAN  Continue protonix  Await manometry in Am  Will follow

## 2019-12-10 NOTE — PROGRESS NOTE ADULT - ASSESSMENT
Patient is a 64y old  Male who presents with a chief complaint of Elevated Hgb (06 Dec 2019 17:23)    #Elevated hemoglobin secondary to suspicion of polycythemia vera  awaiting second opinion but hemoglobin has been trending down    #Dysphagia with possible yeast from inhalaer  egd in am , appreciate ent eval , ct neck unremarkable   nystatin swish and swallow     #eosinophilic esophagitis ppi and momentasone     #CKD 2     #DVT PPX    Progress Note Handoff    Pending: egd in am     Family discussion: patient is of sound mind and agrees to plan of care,    Disposition: Home___

## 2019-12-11 ENCOUNTER — TRANSCRIPTION ENCOUNTER (OUTPATIENT)
Age: 64
End: 2019-12-11

## 2019-12-11 VITALS
TEMPERATURE: 98 F | DIASTOLIC BLOOD PRESSURE: 76 MMHG | HEART RATE: 104 BPM | SYSTOLIC BLOOD PRESSURE: 136 MMHG | RESPIRATION RATE: 18 BRPM

## 2019-12-11 LAB
ANION GAP SERPL CALC-SCNC: 12 MMOL/L — SIGNIFICANT CHANGE UP (ref 7–14)
BUN SERPL-MCNC: 12 MG/DL — SIGNIFICANT CHANGE UP (ref 10–20)
CALCIUM SERPL-MCNC: 9.3 MG/DL — SIGNIFICANT CHANGE UP (ref 8.5–10.1)
CHLORIDE SERPL-SCNC: 102 MMOL/L — SIGNIFICANT CHANGE UP (ref 98–110)
CO2 SERPL-SCNC: 26 MMOL/L — SIGNIFICANT CHANGE UP (ref 17–32)
CREAT SERPL-MCNC: 1.1 MG/DL — SIGNIFICANT CHANGE UP (ref 0.7–1.5)
GLUCOSE SERPL-MCNC: 104 MG/DL — HIGH (ref 70–99)
HCT VFR BLD CALC: 51.4 % — SIGNIFICANT CHANGE UP (ref 42–52)
HGB BLD-MCNC: 17.4 G/DL — SIGNIFICANT CHANGE UP (ref 14–18)
MCHC RBC-ENTMCNC: 29 PG — SIGNIFICANT CHANGE UP (ref 27–31)
MCHC RBC-ENTMCNC: 33.9 G/DL — SIGNIFICANT CHANGE UP (ref 32–37)
MCV RBC AUTO: 85.5 FL — SIGNIFICANT CHANGE UP (ref 80–94)
NRBC # BLD: 0 /100 WBCS — SIGNIFICANT CHANGE UP (ref 0–0)
PLATELET # BLD AUTO: 127 K/UL — LOW (ref 130–400)
POTASSIUM SERPL-MCNC: 4.1 MMOL/L — SIGNIFICANT CHANGE UP (ref 3.5–5)
POTASSIUM SERPL-SCNC: 4.1 MMOL/L — SIGNIFICANT CHANGE UP (ref 3.5–5)
RBC # BLD: 6.01 M/UL — SIGNIFICANT CHANGE UP (ref 4.7–6.1)
RBC # FLD: 12.4 % — SIGNIFICANT CHANGE UP (ref 11.5–14.5)
SODIUM SERPL-SCNC: 140 MMOL/L — SIGNIFICANT CHANGE UP (ref 135–146)
WBC # BLD: 7.76 K/UL — SIGNIFICANT CHANGE UP (ref 4.8–10.8)
WBC # FLD AUTO: 7.76 K/UL — SIGNIFICANT CHANGE UP (ref 4.8–10.8)

## 2019-12-11 PROCEDURE — 99233 SBSQ HOSP IP/OBS HIGH 50: CPT

## 2019-12-11 RX ORDER — PANTOPRAZOLE SODIUM 20 MG/1
1 TABLET, DELAYED RELEASE ORAL
Qty: 30 | Refills: 0
Start: 2019-12-11 | End: 2020-01-09

## 2019-12-11 RX ORDER — NYSTATIN 500MM UNIT
5 POWDER (EA) MISCELLANEOUS
Qty: 140 | Refills: 0
Start: 2019-12-11 | End: 2019-12-24

## 2019-12-11 RX ORDER — PANTOPRAZOLE SODIUM 20 MG/1
1 TABLET, DELAYED RELEASE ORAL
Qty: 0 | Refills: 0 | DISCHARGE

## 2019-12-11 RX ORDER — ALPRAZOLAM 0.25 MG
0.25 TABLET ORAL ONCE
Refills: 0 | Status: DISCONTINUED | OUTPATIENT
Start: 2019-12-11 | End: 2019-12-11

## 2019-12-11 RX ORDER — ALPRAZOLAM 0.25 MG
0.25 TABLET ORAL ONCE
Refills: 0 | Status: COMPLETED | OUTPATIENT
Start: 2019-12-11 | End: 2019-12-11

## 2019-12-11 RX ORDER — BENZOCAINE AND MENTHOL 5; 1 G/100ML; G/100ML
1 LIQUID ORAL
Refills: 0 | Status: DISCONTINUED | OUTPATIENT
Start: 2019-12-11 | End: 2019-12-11

## 2019-12-11 RX ADMIN — Medication 30 MILLIGRAM(S): at 05:44

## 2019-12-11 RX ADMIN — BENZOCAINE AND MENTHOL 1 LOZENGE: 5; 1 LIQUID ORAL at 17:09

## 2019-12-11 RX ADMIN — Medication 500000 UNIT(S): at 05:45

## 2019-12-11 RX ADMIN — Medication 0.25 MILLIGRAM(S): at 05:44

## 2019-12-11 RX ADMIN — Medication 500000 UNIT(S): at 17:09

## 2019-12-11 NOTE — PROGRESS NOTE ADULT - SUBJECTIVE AND OBJECTIVE BOX
DARNELL DANIELS  64y  [facility] [address]    Patient is a 64y old  Male who presents with a chief complaint of Elevated Hgb (10 Dec 2019 08:07)    INTERVAL HPI/OVERNIGHT EVENTS:  Patient is depressed given loss of his son.    He was still c/o difficulty w swallowing, full sensation in R ear, pains in RLQ of abdomen relieved w BMs, and other nonspecific symptoms.        Vital Signs Last 24 Hrs  T(C): 36.5 (11 Dec 2019 15:01), Max: 37.1 (11 Dec 2019 01:25)  T(F): 97.7 (11 Dec 2019 15:01), Max: 98.7 (11 Dec 2019 01:25)  HR: 104 (11 Dec 2019 15:01) (86 - 104)  BP: 136/76 (11 Dec 2019 15:01) (128/76 - 142/90)  BP(mean): --  RR: 18 (11 Dec 2019 15:01) (18 - 20)  SpO2: 99% (11 Dec 2019 13:31) (99% - 99%)    PHYSICAL EXAM:  GENERAL: NAD, well-groomed, well-developed, well-nourished  HEAD:  Atraumatic, Normocephalic   NERVOUS SYSTEM:  Alert & Oriented X3   PULM: Clear to auscultation bilaterally  CARDIAC: Regular rate and rhythm   GI: Soft, Nontender, Nondistended  EXTREMITIES:  2+ Peripheral Pulses           LABS:                17.4   7.76  )-----------( 127      ( 11 Dec 2019 08:36 )             51.4     12-11    140  |  102  |  12  ----------------------------<  104<H>  4.1   |  26  |  1.1    Ca    9.3      11 Dec 2019 08:36

## 2019-12-11 NOTE — DISCHARGE NOTE PROVIDER - CARE PROVIDER_API CALL
Derick Sorai)  Gastroenterology; Internal Medicine  [address] [place] [address]  Phone: [phone]  Fax: [phone]  Follow Up Time: Routine

## 2019-12-11 NOTE — PROGRESS NOTE ADULT - ASSESSMENT
Patient is a 64y old  Male who presents with a chief complaint of Elevated Hgb (06 Dec 2019 17:23)    #Elevated hemoglobin secondary to suspicion of polycythemia vera  awaiting second opinion but hemoglobin has been trending down    #Dysphagia w suspected oropharyngeal candidiasis  f/u GI team and EGD results  nystatin swish and swallow     #eosinophilic esophagitis   ppi and mometasone     #CKD 2        Progress Note Handoff    Pending:  GI f/u and EGD results    Family discussion: patient is of sound mind and agrees to plan of care,    Disposition: Home___  will depend on GI determination Patient is a 64y old  Male who presents with a chief complaint of Elevated Hgb (06 Dec 2019 17:23)    #Elevated hemoglobin secondary to suspicion of polycythemia vera  awaiting second opinion but hemoglobin has been trending down    #Dysphagia w suspected oropharyngeal candidiasis  f/u GI team and EGD results  nystatin swish and swallow     #eosinophilic esophagitis   ppi and mometasone     #CKD 2        Progress Note Handoff    Pending:  GI f/u and EGD results    Family discussion: patient is of sound mind and agrees to plan of care,    Disposition: Home___  will depend on GI determination & will consider psych consult for depression if remains in house and no organic cause of his dysphagia symptoms found

## 2019-12-11 NOTE — PROGRESS NOTE ADULT - REASON FOR ADMISSION
Elevated Hgb

## 2019-12-11 NOTE — DISCHARGE NOTE NURSING/CASE MANAGEMENT/SOCIAL WORK - PATIENT PORTAL LINK FT
You can access the FollowMyHealth Patient Portal offered by [facility] by registering at the following website: [email or web address]. By joining [facility]’s FollowMyHealth portal, you will also be able to view your health information using other applications (apps) compatible with our system.

## 2019-12-11 NOTE — DISCHARGE NOTE PROVIDER - NSDCCPCAREPLAN_GEN_ALL_CORE_FT
PRINCIPAL DISCHARGE DIAGNOSIS  Diagnosis: Elevated hemoglobin  Assessment and Plan of Treatment: You came to the hospital after your primary care provider, Dr. Simon, noted an elevated hemoglobin on routine labwork. You were evaluated by the [facility] Hematology team during your stay. You underwent phlebotomy (250 cc's of blood removed) on Friday, 12/6. Further evaluation by the Hematology team determined you did not require additional phlebotomies while inpatient. A work-up was performed to evaluate for polycythemia vera, but initial workup was negative. Please follow-up with the [facility] Hematology/Oncology team in one week for continued care.      SECONDARY DISCHARGE DIAGNOSES  Diagnosis: Dysphagia  Assessment and Plan of Treatment: While in the hospital, you reported difficulty swallowing solid foods. You were followed by Gastroenterology and Otolaryngology (ENT) during your stay. Speech and swallow evaluated you when you presented to the hospital and recommended a specific PRINCIPAL DISCHARGE DIAGNOSIS  Diagnosis: Elevated hemoglobin  Assessment and Plan of Treatment: You came to the hospital after your primary care provider, Dr. Simon, noted an elevated hemoglobin on routine labwork. You were evaluated by the [facility] Hematology team during your stay. You underwent phlebotomy (250 cc's of blood removed) on Friday, 12/6. Further evaluation by the Hematology team determined you did not require additional phlebotomies while inpatient. A work-up was performed to evaluate for polycythemia vera, but initial workup was negative. Please follow-up with the [facility] Hematology/Oncology team in one week for continued care.      SECONDARY DISCHARGE DIAGNOSES  Diagnosis: Mild anxiety  Assessment and Plan of Treatment: While hospitalized, you were prescribed low doses of Ativan as needed for anxiety and at night. You have not been discharged on this medication as he potential for decreased tolerance is heightened with continued and prolonged use of this medication. Please follow-up with your primary care provider in one week to review your hospital course and to discuss options for alternative anxiety therapies.    Diagnosis: Dysphagia  Assessment and Plan of Treatment: While in the hospital, you reported difficulty swallowing solid foods. You were followed by Gastroenterology and Otolaryngology (ENT) during your stay. Speech and swallow evaluated you when you presented to the hospital and recommended a specific diet which you were placed on. Due to continued issues swallowing, you were seen by ENT in which a scope was performed, which revealed patchy white areas of your larynx, thought to be related to your inhaled steroid use. You were prescribed Nystatin swish and swallow for this. You also underwent CT scan of your neck which was negative for any abnormal findings. On 12/11, you underwent EGD (upper endoscopy) with the Gastroenterology team. Findings included:                       Please follow-up with your gastroenterologist, Dr. Soria, in one week for continued care.

## 2019-12-11 NOTE — DISCHARGE NOTE PROVIDER - NSDCMRMEDTOKEN_GEN_ALL_CORE_FT
NIFEdipine 30 mg oral tablet, extended release: 1 tab(s) orally once a day  pantoprazole 40 mg oral delayed release tablet: 1 tab(s) orally once a day NIFEdipine 30 mg oral tablet, extended release: 1 tab(s) orally once a day  nystatin 100,000 units/mL oral suspension: 5 milliliter(s) orally 2 times a day  pantoprazole 40 mg oral delayed release tablet: 1 tab(s) orally once a day

## 2019-12-11 NOTE — DISCHARGE NOTE PROVIDER - HOSPITAL COURSE
64 year-old male with PMH of HTN was sent in by his PMD, Dr. Simon, for evaluation of elevated Hgb. Patient reported having a headache the night before so he sent him to [facility] for blood work and was found to have hgb of 20. Patient says his father had a history of Polycythemia Vera complicated by CVA. Patient admits to having a headache, dry mouth, intermittent fever/chills, intermittent itching, abdominal pain, and generalized body aches that started a few days ago and progressively had gotten worse. Denies CP, SOB, nausea, vomiting, diarrhea, constipation and dysuria. Patient also reported that he had EGD done recently for dysphagia. GE junction biopsy showed intraepithelial eosinophils and reflex esophagitis. ED vitals: /88, HR 89, RR 16, T 97.9F, sPO2 98%.        # Polycythemia, r/o primary vs. 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 Patient has + family history of polycythemia vera in father c/b CVA.    - Had phlebotomy session 12/6 with removal of 250 cc's blood. Discussed with heme/onc- no additional phlebotomy session 12/9. Will need outpatient follow-up with Heme/Onc     - No splenomegaly visualized on abdominal US. No thrombosis or leukocytosis. No c/o erythromelalgia. Mildly symptomatic having HA and pruritis/flushing with hot bath/shower. Also complaining of vision blurriness periodically    - EPO level decreased (2.3) from 12/6    - F/u JAK2 mutation labs (sent 12/9)    - Per Heme/onc no further phlebotomy sessions needed. Consult placed for Dr. Li (Hematology) for additional opinion as he is known to patient's family. Will f/u furhter recs        # Dysphagia, esophageal vs. oropharyngeal    - Reports difficulty swallowing solids since over a month ago, which prompted outpatient workup. No difficulty swallowing liquids, however states there is a sensation the solid food gets stuck in his mouth when swallowing    - Also c/o dryness in mouth. Labs from [facility] site reviewed. Negative: syphilis, strongyloides, CCP IgG, anti-SSA & B, intrinsic factor    - GI (Dr. Soria) following. Per GI, inpatient motility study with manometry 12/11. Will f/u results    - Underwent esophagram and EGD in November as outpatient. Per GI, esophagram negative at that time. EGD revealed normal mucosa throughout    - Speech and swallow eval noted from admission. Called again 12/9 as patient denying evaluation was completed. Discussed with s+s- patient may benefit from outpatient modified barium swallow given symptoms (although patient already had this completed prior to hospitalization)    - ENT consulted per patient request 12/9. ENT performed endoscopic exam revealing patchy white secretions on larynx, possibly 2/2 yeast infection related to steroid inhaler use; recommended Nystatin swish/swallow. Also recommended CT neck with IV contrast to evaluate right-sided neck pain radiating to ear- CT negative for any pathology        # HTN, well-controlled    - Continue Procardia 20 mg qD        # History of strongyloides    - Patient describes being diagnosed in past after working at [employer] site post 9/11    - Was treated as outpatient in past. Strongyloides antibody negative on recent labs    - Eosinophils not elevated on CBC    - Patient reports being told there were numerous eosinophils visualized on prior EGD, but not enough to make diagnosis of EOE. Unclear if this is related        #DVT ppx: Lovenox 40 mg subcutaneous daily    #GI ppx: Protonix    #Diet: Dysphagia 3, soft, nectar, DASH    #Activity: ambulating    #Dispo: from home, pending further workup    #Code status: FULL CODE 64 year-old male with PMH of HTN was sent in by his PMD, Dr. Simon, for evaluation of elevated Hgb. Patient reported having a headache the night before so he sent him to [facility] for blood work and was found to have hgb of 20. Patient says his father had a history of Polycythemia Vera complicated by CVA. Patient admits to having a headache, dry mouth, intermittent fever/chills, intermittent itching, abdominal pain, and generalized body aches that started a few days ago and progressively had gotten worse. Denies CP, SOB, nausea, vomiting, diarrhea, constipation and dysuria. Patient also reported that he had EGD done recently for dysphagia. GE junction biopsy showed intraepithelial eosinophils and reflex esophagitis. ED vitals: /88, HR 89, RR 16, T 97.9F, sPO2 98%.        # Polycythemia, r/o primary vs. 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 Patient has + family history of polycythemia vera in father c/b CVA.    - Had phlebotomy session 12/6 with removal of 250 cc's blood. Discussed with heme/onc- no additional phlebotomy session 12/9. Will need outpatient follow-up with Heme/Onc     - No splenomegaly visualized on abdominal US. No thrombosis or leukocytosis. No c/o erythromelalgia. Mildly symptomatic having HA and pruritis/flushing with hot bath/shower. Also complaining of vision blurriness periodically    - EPO level decreased (2.3) from 12/6    - F/u JAK2 mutation labs (sent 12/9)    - Per Heme/onc no further phlebotomy sessions needed. Consult placed for Dr. Li (Hematology) for additional opinion as he is known to patient's family. Will f/u furhter recs        # Dysphagia, esophageal vs. oropharyngeal    - Reports difficulty swallowing solids since over a month ago, which prompted outpatient workup. No difficulty swallowing liquids, however states there is a sensation the solid food gets stuck in his mouth when swallowing    - Also c/o dryness in mouth. Labs from [facility] site reviewed. Negative: syphilis, strongyloides, CCP IgG, anti-SSA & B, intrinsic factor    - GI (Dr. Soria) following. Per GI, inpatient motility study with manometry 12/11. Will f/u results    - Underwent esophagram and EGD in November as outpatient. Per GI, esophagram negative at that time. EGD revealed normal mucosa throughout    - Speech and swallow eval noted from admission. Called again 12/9 as patient denying evaluation was completed. Discussed with s+s- patient may benefit from outpatient modified barium swallow given symptoms (although patient already had this completed prior to hospitalization)    - ENT consulted per patient request 12/9. ENT performed endoscopic exam revealing patchy white secretions on larynx, possibly 2/2 yeast infection related to steroid inhaler use; recommended Nystatin swish/swallow. 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 Strongyloides antibody negative on recent labs    - Eosinophils not elevated on CBC    - Patient reports being told there were numerous eosinophils visualized on prior EGD, but not enough to make diagnosis of EOE. Unclear if this is related            EGD and manometry showed no abnl and patient can be DC to f/u with Dr. Soria

## 2019-12-11 NOTE — PROGRESS NOTE ADULT - ASSESSMENT
64 year-old male with PMH of HTN, and strongyloides was sent in by his PMD, Dr. Simon, for evaluation of elevated Hgb    # Polycythemia, r/o primary vs. 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 Patient has + family history of polycythemia vera in father c/b CVA.  - Had phlebotomy session 12/6 with removal of 250 cc's blood. Discussed with heme/onc- no additional phlebotomy session 12/9. Will need outpatient follow-up with Heme/Onc   - No splenomegaly visualized on abdominal US. No thrombosis or leukocytosis. No c/o erythromelalgia. Mildly symptomatic having HA and pruritis/flushing with hot bath/shower. Also complaining of vision blurriness periodically  - EPO level decreased (2.3) from 12/6  - F/u JAK2 mutation labs (sent 12/9)  - Per Heme/onc no further phlebotomy sessions needed. Consult placed for Dr. Li (Hematology) for additional opinion as he is known to patient's family. Will f/u furhter recs    # Dysphagia, esophageal vs. oropharyngeal  - Reports difficulty swallowing solids since over a month ago, which prompted outpatient workup. No difficulty swallowing liquids, however states there is a sensation the solid food gets stuck in his mouth when swallowing  - Also c/o dryness in mouth. Labs from [facility] reviewed. Negative: syphilis, strongyloides, CCP IgG, anti-SSA & B, intrinsic factor  - GI (Dr. Soria) following. Per GI, inpatient motility study with manometry 12/11. Will f/u results  - Underwent esophagram and EGD in November as outpatient. Per GI, esophagram negative at that time. EGD revealed normal mucosa throughout  - Speech and swallow eval noted from admission. Called again 12/9 as patient denying evaluation was completed. Discussed with s+s- patient may benefit from outpatient modified barium swallow given symptoms (although patient already had this completed prior to hospitalization)  - ENT consulted per patient request 12/9. ENT performed endoscopic exam revealing patchy white secretions on larynx, possibly 2/2 yeast infection related to steroid inhaler use; recommended Nystatin swish/swallow. Also recommended CT neck with IV contrast to evaluate right-sided neck pain radiating to ear- CT negative for any pathology    # HTN, well-controlled  - Continue Procardia 20 mg qD    # History of strongyloides  - Patient describes being diagnosed in past after working at [employer] site post 9/11  - Was treated as outpatient in past. Strongyloides antibody negative on recent labs  - Eosinophils not elevated on CBC  - Patient reports being told there were numerous eosinophils visualized on prior EGD, but not enough to make diagnosis of EOE. Unclear if this is related    #DVT ppx: Lovenox 40 mg subcutaneous daily  #GI ppx: Protonix  #Diet: Dysphagia 3, soft, nectar, DASH  #Activity: ambulating  #Dispo: from home, pending further workup  #Code status: FULL CODE  #Follow-up: _____Motility/manometry testing, possible psych eval?_______

## 2019-12-11 NOTE — DISCHARGE NOTE PROVIDER - NSDCFUSCHEDAPPT_GEN_ALL_CORE_FT
DARNELL DANIELS ; 01/02/2020 ; [facility]  DARNELL DANIELS ; 02/18/2020 ; NPP Gastro Doc Off [id number] DARNELL DANIELS ; 01/02/2020 ; [facility]  DARNELL DANIELS ; 02/18/2020 ; NPP Gastro Doc Off [id number] DARNELL DANIELS ; 01/02/2020 ; [facility]  DRANELL DANIELS ; 02/18/2020 ; NPP Gastro Doc Off [id number]

## 2019-12-11 NOTE — PROGRESS NOTE ADULT - SUBJECTIVE AND OBJECTIVE BOX
SUBJECTIVE:    Patient is a 64y old Male who presents with a chief complaint of Elevated Hgb (10 Dec 2019 16:45)    Currently admitted to medicine with the primary diagnosis of Elevated hemoglobin     Today is hospital day 6d. This morning he is resting comfortably in bed when seen and examined. Patient is very anxious regarding hospitalization. States he woke up at 4 AM very anxious. Aware of plan for EGD today with GI and NPO status.    PAST MEDICAL & SURGICAL HISTORY  HTN (hypertension)  Eosinophilic esophagitis  Intestinal obstruction  Strangulated inguinal hernia    ALLERGIES:  No Known Allergies    MEDICATIONS:  STANDING MEDICATIONS  ALPRAZolam 0.5 milliGRAM(s) Oral at bedtime  benzocaine 15 mG/menthol 3.6 mG (Sugar-Free) Lozenge 1 Lozenge Oral two times a day  chlorhexidine 4% Liquid 1 Application(s) Topical <User Schedule>  enoxaparin Injectable 40 milliGRAM(s) SubCutaneous daily  mometasone 220 MICROgram(s) Inhaler 2 Puff(s) Inhalation two times a day  NIFEdipine XL 30 milliGRAM(s) Oral daily  nystatin    Suspension 034675 Unit(s) Oral two times a day  pantoprazole    Tablet 40 milliGRAM(s) Oral before breakfast    PRN MEDICATIONS  acetaminophen   Tablet .. 650 milliGRAM(s) Oral every 6 hours PRN    VITALS:   T(F): 97  HR: 101  BP: 135/75  RR: 18  SpO2: --    LABS:                        17.4   7.76  )-----------( 127      ( 11 Dec 2019 08:36 )             51.4     12-11    140  |  102  |  12  ----------------------------<  104<H>  4.1   |  26  |  1.1    Ca    9.3      11 Dec 2019 08:36    RADIOLOGY:  < from: CT Neck Soft Tissue w/ IV Cont (12.10.19 @ 13:15) >  EXAM:  CT NECK SOFT TISSUE IC          PROCEDURE DATE:  12/10/2019      INTERPRETATION:  Clinical History / Reason for exam: Dysphagia.   Right-sided neck pain radiating to jaw.    Technique: CT of the neck with contrast. Contiguous CT axial images of   the neck following the intravenous administration of 100 cc of Optiray   with coronal and sagittal reformats.    COMPARISON: None available    FINDINGS:    The visualized brain parenchyma is unremarkable. The globes and orbits   are unremarkable.    There is minimal scattered paranasal sinus mucosal thickening. The   mastoids are clear.    The parotid and the submandibular glands are unremarkable.    There is no CT evidence of pathologic cervical adenopathy.    The thyroid gland is unremarkable.    The visualized upper aerodigestive structures demonstrate no evidence of   discrete mass or inflammation. There are bilateral palatine tonsillar   calcifications.    The visualized lung apices demonstrate no focal consolidation.    There are mild to moderate cervical spine degenerative changes.    IMPRESSION:    Unremarkable CT of the neck.    PHYSICAL EXAM:  GEN: No acute distress, anxious  PULM/CHEST: Clear to auscultation bilaterally, no rales, rhonchi or wheezes   CVS: Regular rate and rhythm, S1-S2, no murmurs  ABD: Soft, non-tender, non-distended, +BS  EXT: No edema  NEURO: AAOx3

## 2019-12-13 PROBLEM — I10 ESSENTIAL (PRIMARY) HYPERTENSION: Chronic | Status: ACTIVE | Noted: 2019-12-06

## 2019-12-13 PROBLEM — Z00.00 ENCOUNTER FOR PREVENTIVE HEALTH EXAMINATION: Status: ACTIVE | Noted: 2019-12-13

## 2019-12-13 LAB — JAK2 P.V617F BLD/T QL: SIGNIFICANT CHANGE UP

## 2019-12-18 DIAGNOSIS — N18.2 CHRONIC KIDNEY DISEASE, STAGE 2 (MILD): ICD-10-CM

## 2019-12-18 DIAGNOSIS — Z92.241 PERSONAL HISTORY OF SYSTEMIC STEROID THERAPY: ICD-10-CM

## 2019-12-18 DIAGNOSIS — D45 POLYCYTHEMIA VERA: ICD-10-CM

## 2019-12-18 DIAGNOSIS — I12.9 HYPERTENSIVE CHRONIC KIDNEY DISEASE WITH STAGE 1 THROUGH STAGE 4 CHRONIC KIDNEY DISEASE, OR UNSPECIFIED CHRONIC KIDNEY DISEASE: ICD-10-CM

## 2019-12-18 DIAGNOSIS — R13.12 DYSPHAGIA, OROPHARYNGEAL PHASE: ICD-10-CM

## 2019-12-18 DIAGNOSIS — F41.9 ANXIETY DISORDER, UNSPECIFIED: ICD-10-CM

## 2019-12-18 DIAGNOSIS — Z87.891 PERSONAL HISTORY OF NICOTINE DEPENDENCE: ICD-10-CM

## 2019-12-18 DIAGNOSIS — R71.8 OTHER ABNORMALITY OF RED BLOOD CELLS: ICD-10-CM

## 2019-12-19 ENCOUNTER — APPOINTMENT (OUTPATIENT)
Dept: OTOLARYNGOLOGY | Facility: CLINIC | Age: 64
End: 2019-12-19
Payer: COMMERCIAL

## 2019-12-19 VITALS
BODY MASS INDEX: 27.59 KG/M2 | SYSTOLIC BLOOD PRESSURE: 120 MMHG | HEIGHT: 74 IN | DIASTOLIC BLOOD PRESSURE: 78 MMHG | WEIGHT: 215 LBS

## 2019-12-19 DIAGNOSIS — F17.200 NICOTINE DEPENDENCE, UNSPECIFIED, UNCOMPLICATED: ICD-10-CM

## 2019-12-19 DIAGNOSIS — K31.9 DISEASE OF STOMACH AND DUODENUM, UNSPECIFIED: ICD-10-CM

## 2019-12-19 DIAGNOSIS — Z80.8 FAMILY HISTORY OF MALIGNANT NEOPLASM OF OTHER ORGANS OR SYSTEMS: ICD-10-CM

## 2019-12-19 PROCEDURE — 99214 OFFICE O/P EST MOD 30 MIN: CPT | Mod: 25

## 2019-12-19 PROCEDURE — 31575 DIAGNOSTIC LARYNGOSCOPY: CPT

## 2019-12-19 RX ORDER — CLONAZEPAM 0.5 MG/1
0.5 TABLET, ORALLY DISINTEGRATING ORAL
Qty: 60 | Refills: 0 | Status: ACTIVE | COMMUNITY
Start: 2019-12-04

## 2019-12-19 RX ORDER — FLUCONAZOLE 200 MG/1
200 TABLET ORAL DAILY
Qty: 14 | Refills: 0 | Status: ACTIVE | COMMUNITY
Start: 2019-12-19 | End: 1900-01-01

## 2019-12-19 RX ORDER — PANTOPRAZOLE 40 MG/1
40 TABLET, DELAYED RELEASE ORAL TWICE DAILY
Qty: 60 | Refills: 3 | Status: ACTIVE | COMMUNITY
Start: 2019-12-19 | End: 1900-01-01

## 2019-12-19 RX ORDER — PANTOPRAZOLE 40 MG/1
TABLET, DELAYED RELEASE ORAL
Refills: 0 | Status: ACTIVE | COMMUNITY

## 2019-12-19 NOTE — HISTORY OF PRESENT ILLNESS
[de-identified] : Patient presents today with c/o dysphagia. He was having food getting stuck.  Patient admits this started a few weeks ago. He was scoped and started to have hoarseness and dry mouth. Currently concerned with dry mouth. He is able to swallow solid foods but has to drink excessive water. Some soreness when swallowing. More so on the right side. Patient denies smoking. He admits he smokes marijuana at night due to his anxiety. He has vaped during the summer months. He did loose about 7 lbs during the time when he wasn’t eating. Patient completed CT neck, results reviewed and detailed below. Pt had extensive blood work with no significant findings. Pt had history fungal laryngitis. Swallow study was overall normal.

## 2019-12-23 ENCOUNTER — APPOINTMENT (OUTPATIENT)
Dept: HEMATOLOGY ONCOLOGY | Facility: CLINIC | Age: 64
End: 2019-12-23
Payer: COMMERCIAL

## 2019-12-23 ENCOUNTER — OUTPATIENT (OUTPATIENT)
Dept: OUTPATIENT SERVICES | Facility: HOSPITAL | Age: 64
LOS: 1 days | Discharge: HOME | End: 2019-12-23

## 2019-12-23 VITALS
RESPIRATION RATE: 14 BRPM | WEIGHT: 217 LBS | HEART RATE: 93 BPM | SYSTOLIC BLOOD PRESSURE: 150 MMHG | DIASTOLIC BLOOD PRESSURE: 75 MMHG | TEMPERATURE: 97.6 F | HEIGHT: 74 IN | BODY MASS INDEX: 27.85 KG/M2

## 2019-12-23 DIAGNOSIS — K40.30 UNILATERAL INGUINAL HERNIA, WITH OBSTRUCTION, WITHOUT GANGRENE, NOT SPECIFIED AS RECURRENT: Chronic | ICD-10-CM

## 2019-12-23 DIAGNOSIS — Z83.2 FAMILY HISTORY OF DISEASES OF THE BLOOD AND BLOOD-FORMING ORGANS AND CERTAIN DISORDERS INVOLVING THE IMMUNE MECHANISM: ICD-10-CM

## 2019-12-23 DIAGNOSIS — K56.609 UNSPECIFIED INTESTINAL OBSTRUCTION, UNSPECIFIED AS TO PARTIAL VERSUS COMPLETE OBSTRUCTION: Chronic | ICD-10-CM

## 2019-12-23 PROCEDURE — 99213 OFFICE O/P EST LOW 20 MIN: CPT

## 2019-12-26 DIAGNOSIS — D75.1 SECONDARY POLYCYTHEMIA: ICD-10-CM

## 2019-12-26 NOTE — HISTORY OF PRESENT ILLNESS
[de-identified] : Mr. DARNELL DANIELS is a 64 year old male here today for evaluation of erythrocytosis\par \par He is a 65 y/o male with pMHx of HTN was sent to ER by his PMD Dr. Simon for evaluation of elevated Hgb.  Pt reports he was having a headache the night before so he sent\par him at [facility] for blood work and was found to have hgb of 20.  Patient says his father had a history of Polycythemia Vera. He reported headache, dry mouth, intermittent fever/chills, intermittent itching, abdominal pain and generalized body aches started few days ago and progressively had gotten worse.  He reports using testosterone in past, quit using about 5 months ago . Reports that he feels headache with some flushing and pruritus especially after taking a hot shower.   He was phlebotomized once during hospitalization.  Last CBC from 12/11 demonstrated hgb of 17.2g/dL.  However, he went for phlebotomy at blood donation center again last week.  He states he sleeps well at night and went for negative sleep apnea study about 4-5 years ago.  \par \par LAB WORKUP \par (11.12.19) WBC 6.55, Hgb 18.5, Hct 56.5, \par (06.28.18) WBC 12.02, Hgb 17, Hct 49.4, \par (10.29.16) WBC 10.39, Hgb 18.2, Hct 54, MCV 87.4, \par  EPO as low, and [id number] negative\par HCM\par EGD done recently for dysphagia. GE junction bx showed intraepithelial eosinophils and reflex esophagitis; says he is supposed to get another EGD.

## 2019-12-26 NOTE — ASSESSMENT
[FreeTextEntry1] : 65 y/o male with elevated Hgb suspect was due to   prior testosterone use\par \par # Erythrocytosis, SHER 2 (-); EPO was low at 2.3;  No Family h/o polycythemia.   ( prior use of testosterone ), does not smoke, no carbon monoxide exposure, previous CBCs were fine. \par - Us abd reviewed , no splenomegaly , Blood work not showing any thrombosis or elevated wbc\par - recommend sleep apnea testing to r/o other causes for his polycythemia\par - continue to abstain from testosterone supplementation\par - will continue to monitor; will asses for the need of repeating phlebotomies in future, explained that the only way to know if his Hgb was high due to testosterone is if he does not take it anymore and he hold off blood donations and we can trend his CBC.\par - will send for SHER 2 EXON 12 mutation as well for completion but this is not likely \par \par RTC in 3 months with CBC

## 2019-12-26 NOTE — REVIEW OF SYSTEMS
[Negative] : Allergic/Immunologic [Fever] : no fever [Chills] : no chills [Chest Pain] : no chest pain [Shortness Of Breath] : no shortness of breath

## 2020-01-09 ENCOUNTER — APPOINTMENT (OUTPATIENT)
Dept: OTOLARYNGOLOGY | Facility: CLINIC | Age: 65
End: 2020-01-09
Payer: COMMERCIAL

## 2020-01-09 DIAGNOSIS — J39.2 OTHER DISEASES OF PHARYNX: ICD-10-CM

## 2020-01-09 DIAGNOSIS — R07.0 PAIN IN THROAT: ICD-10-CM

## 2020-01-09 PROCEDURE — 99212 OFFICE O/P EST SF 10 MIN: CPT | Mod: 25

## 2020-01-09 PROCEDURE — 31575 DIAGNOSTIC LARYNGOSCOPY: CPT

## 2020-01-09 RX ORDER — FLUTICASONE PROPIONATE 220 UG/1
220 AEROSOL, METERED RESPIRATORY (INHALATION)
Qty: 12 | Refills: 0 | Status: COMPLETED | COMMUNITY
Start: 2019-11-27 | End: 2020-01-09

## 2020-01-09 RX ORDER — FAMOTIDINE 40 MG/1
40 TABLET, FILM COATED ORAL
Qty: 90 | Refills: 3 | Status: ACTIVE | COMMUNITY
Start: 2020-01-09 | End: 1900-01-01

## 2020-01-09 NOTE — PROCEDURE
[Oxymetazoline HCl] : oxymetazoline HCl [Topical Lidocaine] : topical lidocaine [Flexible Endoscope] : examined with the flexible endoscope [Normal] : the false vocal folds were pink and regular, the ventricular sulcus was open, the true vocal folds were glistening white, tense and of equal length, mobility, and height [Complicated Symptoms] : complicated symptoms requiring more thorough examination than provided by mirror [Glottis Arytenoid Cartilages Erythema] : bilateral arytenoid ~M erythema [Arytenoid Edema ___ /4] : arytenoid edema [unfilled]U/4 [Arytenoid Erythema ___ /4] : arytenoid erythema [unfilled]U/4 [Interarytenoid Edema] : interarytenoid area edematous

## 2020-01-09 NOTE — HISTORY OF PRESENT ILLNESS
[FreeTextEntry1] : 1/9/2020 Patient is returning today for throat swelling, and pain. patient was prescribed famotidine, but patient was not given to the patient due to dosage. no improvements.

## 2020-01-14 ENCOUNTER — APPOINTMENT (OUTPATIENT)
Dept: SURGERY | Facility: CLINIC | Age: 65
End: 2020-01-14
Payer: COMMERCIAL

## 2020-01-14 VITALS — WEIGHT: 229 LBS | HEIGHT: 73 IN | BODY MASS INDEX: 30.35 KG/M2

## 2020-01-14 PROCEDURE — 99243 OFF/OP CNSLTJ NEW/EST LOW 30: CPT

## 2020-01-14 NOTE — ASSESSMENT
[FreeTextEntry1] : Jh is a pleasant 64-year-old  with a past medical history significant for GERD and a past surgical history significant for an exploratory laparotomy in the past for acute appendicitis associated with a small bowel obstruction and an umbilical hernia repair was repaired at the same time in June 2011 followed by a left inguinal hernia repair with mesh in September 2014 now presented to the office with an enlarging and tender bulge in the mid upper abdomen several fingerbreadths above the umbilicus along with pain in the left groin which both began after lifting a 180 pound Rottweiler into his car prompting a visit with me today.\par \par Physical examination demonstrates a large strawberry size tender bulge 2-3 fingerbreadths above the umbilicus which is reducible with a moderate degree of difficulty system with a large symptomatic ventral hernia warranting surgical repair. This is complicated by a mild diastasis recti which is of no significant clinical concern. His umbilical examination is unremarkable. Examination of his left groin demonstrates a moderate size tender easily reducible (probably indirect) recurrent left inguinal hernia warranting concomitant repair. Examination of his right groin demonstrates some moderate weakness but no obvious hernia. Both testicles are normal. His current BMI is 30.\par \par I explained the pros and cons of surgery, as well as all risks, benefits, indications and alternatives of the procedure and the patient understood and agreed. The patient would like this done as soon as possible in light of progressively worsening symptoms. Jh was scheduled for the repair of his ventral hernia with mesh and his recurrent left inguinal hernia with mesh on Friday, January 31, 2020 under local with IV sed[facility] at [facility] with presurgical testing waived.  The patient was encouraged to avoid heavy lifting and strenuous activity in the interim, of course.

## 2020-01-14 NOTE — CONSULT LETTER
[Dear  ___] : Dear  [unfilled], [Courtesy Letter:] : I had the pleasure of seeing your patient, [unfilled], in my office today. [Please see my note below.] : Please see my note below. [FreeTextEntry3] : Respectfully,\par \par Shankar Cook M.D., FACS\par  [Consult Closing:] : Thank you very much for allowing me to participate in the care of this patient.  If you have any questions, please do not hesitate to contact me.

## 2020-01-14 NOTE — PHYSICAL EXAM
[JVD] : no jugular venous distention  [Normal Breath Sounds] : Normal breath sounds [No Rash or Lesion] : No rash or lesion [Alert] : alert [Calm] : calm [de-identified] : normal [de-identified] : overweight [de-identified] : normal testicles [de-identified] : mildly protuberant abdomen, mild diastasis recti\par \par  [de-identified] : recurrent left inguinal hernia, ventral hernia

## 2020-01-23 ENCOUNTER — APPOINTMENT (OUTPATIENT)
Dept: OTOLARYNGOLOGY | Facility: CLINIC | Age: 65
End: 2020-01-23

## 2020-01-31 ENCOUNTER — APPOINTMENT (OUTPATIENT)
Dept: SURGERY | Facility: AMBULATORY SURGERY CENTER | Age: 65
End: 2020-01-31
Payer: COMMERCIAL

## 2020-01-31 ENCOUNTER — OUTPATIENT (OUTPATIENT)
Dept: OUTPATIENT SERVICES | Facility: HOSPITAL | Age: 65
LOS: 1 days | Discharge: HOME | End: 2020-01-31

## 2020-01-31 VITALS
OXYGEN SATURATION: 97 % | TEMPERATURE: 98 F | RESPIRATION RATE: 16 BRPM | WEIGHT: 210.1 LBS | DIASTOLIC BLOOD PRESSURE: 68 MMHG | SYSTOLIC BLOOD PRESSURE: 111 MMHG | HEIGHT: 73 IN | HEART RATE: 65 BPM

## 2020-01-31 VITALS
OXYGEN SATURATION: 99 % | RESPIRATION RATE: 18 BRPM | SYSTOLIC BLOOD PRESSURE: 104 MMHG | HEART RATE: 58 BPM | DIASTOLIC BLOOD PRESSURE: 67 MMHG

## 2020-01-31 DIAGNOSIS — K56.609 UNSPECIFIED INTESTINAL OBSTRUCTION, UNSPECIFIED AS TO PARTIAL VERSUS COMPLETE OBSTRUCTION: Chronic | ICD-10-CM

## 2020-01-31 DIAGNOSIS — K40.30 UNILATERAL INGUINAL HERNIA, WITH OBSTRUCTION, WITHOUT GANGRENE, NOT SPECIFIED AS RECURRENT: Chronic | ICD-10-CM

## 2020-01-31 PROCEDURE — 49561: CPT | Mod: XS

## 2020-01-31 PROCEDURE — 49568: CPT | Mod: XS

## 2020-01-31 PROCEDURE — 49520 REREPAIR ING HERNIA REDUCE: CPT | Mod: LT,XS

## 2020-01-31 RX ORDER — SODIUM CHLORIDE 9 MG/ML
1000 INJECTION, SOLUTION INTRAVENOUS
Refills: 0 | Status: DISCONTINUED | OUTPATIENT
Start: 2020-01-31 | End: 2020-02-17

## 2020-01-31 RX ORDER — ONDANSETRON 8 MG/1
4 TABLET, FILM COATED ORAL ONCE
Refills: 0 | Status: COMPLETED | OUTPATIENT
Start: 2020-01-31 | End: 2020-01-31

## 2020-01-31 RX ORDER — OXYCODONE AND ACETAMINOPHEN 5; 325 MG/1; MG/1
1 TABLET ORAL EVERY 4 HOURS
Refills: 0 | Status: DISCONTINUED | OUTPATIENT
Start: 2020-01-31 | End: 2020-01-31

## 2020-01-31 RX ORDER — MORPHINE SULFATE 50 MG/1
2 CAPSULE, EXTENDED RELEASE ORAL
Refills: 0 | Status: DISCONTINUED | OUTPATIENT
Start: 2020-01-31 | End: 2020-01-31

## 2020-01-31 RX ORDER — TRAMADOL HYDROCHLORIDE 50 MG/1
1 TABLET ORAL
Qty: 30 | Refills: 0
Start: 2020-01-31 | End: 2020-02-04

## 2020-01-31 RX ADMIN — OXYCODONE AND ACETAMINOPHEN 1 TABLET(S): 5; 325 TABLET ORAL at 10:45

## 2020-01-31 RX ADMIN — ONDANSETRON 4 MILLIGRAM(S): 8 TABLET, FILM COATED ORAL at 10:18

## 2020-01-31 RX ADMIN — SODIUM CHLORIDE 100 MILLILITER(S): 9 INJECTION, SOLUTION INTRAVENOUS at 09:16

## 2020-01-31 RX ADMIN — MORPHINE SULFATE 2 MILLIGRAM(S): 50 CAPSULE, EXTENDED RELEASE ORAL at 10:15

## 2020-01-31 NOTE — BRIEF OPERATIVE NOTE - NSICDXBRIEFPOSTOP_GEN_ALL_CORE_FT
POST-OP DIAGNOSIS:  Incarcerated incisional hernia 31-Jan-2020 09:35:14  Shankar Cook  Incarcerated ventral hernia 31-Jan-2020 09:35:12  Shankar Cook  Recurrent left inguinal hernia 31-Jan-2020 09:35:06  Shankar Cook

## 2020-01-31 NOTE — CHART NOTE - NSCHARTNOTEFT_GEN_A_CORE
PACU ANESTHESIA PACU ADMISSION NOTE      Procedure:  Post op diagnosis    ____ Intubated  TV:______       Rate: ______      FiO2: ______    _x___ Patent Airway    ___x_ Full return of protective reflexes    ____ Full recovery from anesthesia / sedation to baseline status    Viitals:  see anesthesia record          Mental Status:  x____ Awake   _____ Alert   _____ Drowsy   _____ Sedated    Nausea/Vomiting: ____ Yes, See Post - Op Orders      ___x_ No    Pain Scale (0-10): _____    Treatment: ____ None    __x__ See Post - Op/PCA Orders    Post - Operative Fluids:   ____ Oral   __x__ See Post - Op Orders    Plan:         Discharge:   _x___Home       _____Floor         _____Critical Care    _____Other:_________________    Comments: uneventful perioperative course; no s/s of anesthesia complications noted; D/C home when criteria met

## 2020-01-31 NOTE — BRIEF OPERATIVE NOTE - NSICDXBRIEFPREOP_GEN_ALL_CORE_FT
PRE-OP DIAGNOSIS:  Ventral hernia 31-Jan-2020 09:35:03  Shankar Cook  Recurrent left inguinal hernia 31-Jan-2020 09:35:01  Shankar Cook

## 2020-01-31 NOTE — ASU DISCHARGE PLAN (ADULT/PEDIATRIC) - ASU DC SPECIAL INSTRUCTIONSFT
Diet    Eat light on the day of surgery. Nausea and vomiting can occur after anesthesia,   but usually resolve within 24 hours.  Resume normal diet the following day.      Activity    Rest!  No heavy lifting or strenuous activity.    Medications    Ibuprofen (Advil, Motrin), Naprosyn (Aleve) or Extra-Strength Tylenol for pain.  Tramadol for severe pain only.  Your prescription was sent electronically to your pharmacy.  Remember, Tramadol is a strong narcotic-like pain reliever which can cause drowsiness, upset stomach and constipation.  It should always be taken with food.  You can use stool softener (Mineral Oil) or laxative (MiraLax or Dulcolax) if constipated.  An antibiotic is given during surgery.  No antibiotic needed at home.   Resume all previous medications.  Resume blood thinners tonight.    Wound Care    Leave surgical dressing in place.  May shower (dressing is waterproof.)  No pool, ocean, lake, hot-tub or   bath for 3 weeks. If you were given an abdominal binder, please wear it as much as possible (day & night)   for 2 weeks, but you must remove it for showers.  Ice packs to the area intermittently for several days help   with pain and swelling.  Bruising (“black and blue”) is common.  Treatment is…Ice & Rest.

## 2020-01-31 NOTE — ASU DISCHARGE PLAN (ADULT/PEDIATRIC) - CARE PROVIDER_API CALL
Shankar Cook)  Surgery  [address] [facility], [facility] [address]  [place] [address]  Phone: [phone]  Fax: [phone]  Scheduled Appointment: 02/11/2020

## 2020-01-31 NOTE — BRIEF OPERATIVE NOTE - NSICDXBRIEFPROCEDURE_GEN_ALL_CORE_FT
PROCEDURES:  Repair of incarcerated incisional hernia using mesh 31-Jan-2020 09:35:41  Shankar Cook  Repair of incarcerated ventral hernia with mesh 31-Jan-2020 09:35:39  Shankar Cook  Repair of recurrent reducible inguinal hernia using mesh 31-Jan-2020 09:35:25 Left Shankar Cook

## 2020-02-05 DIAGNOSIS — K43.0 INCISIONAL HERNIA WITH OBSTRUCTION, WITHOUT GANGRENE: ICD-10-CM

## 2020-02-05 DIAGNOSIS — I10 ESSENTIAL (PRIMARY) HYPERTENSION: ICD-10-CM

## 2020-02-05 DIAGNOSIS — K40.91 UNILATERAL INGUINAL HERNIA, WITHOUT OBSTRUCTION OR GANGRENE, RECURRENT: ICD-10-CM

## 2020-02-11 ENCOUNTER — APPOINTMENT (OUTPATIENT)
Dept: SURGERY | Facility: CLINIC | Age: 65
End: 2020-02-11
Payer: COMMERCIAL

## 2020-02-11 DIAGNOSIS — K40.91 UNILATERAL INGUINAL HERNIA, W/OUT OBSTRUCTION OR GANGRENE, RECURRENT: ICD-10-CM

## 2020-02-11 DIAGNOSIS — K43.9 VENTRAL HERNIA W/OUT OBSTRUCTION OR GANGRENE: ICD-10-CM

## 2020-02-11 PROCEDURE — 99024 POSTOP FOLLOW-UP VISIT: CPT

## 2020-02-11 NOTE — ASSESSMENT
[FreeTextEntry1] : Jh underwent the repair of his incarcerated ventral hernia with mesh, his incarcerated multifocal incisional hernia repair with mesh in the repair of his recurrent left inguinal hernia with mesh on January 31, 2020 under local with IV sedation without any problems or complications. His wound is clean, dry and intact. There is no evidence of erythema, seroma formation or infection. He is tolerating a diet and having normal bowel movements. He denies any significant postoperative pain or discomfort at this time.\par \par Jh was counseled and reassured. He was discharged from the office with no specific followup necessary, but he knows to avoid any heavy lifting or strenuous activity for the next several weeks. We also discussed the importance of calorie restriction and healthy eating with regard to weight loss, hernia recurrence and one's overall health. The patient was encouraged to continue to wear an abdominal binder for the better part of the next month.

## 2020-02-11 NOTE — CONSULT LETTER
[FreeTextEntry1] : Dear Dr. Abdoulaye Simon, \par \par I had the pleasure of seeing your patient, DARNELL DANIELS, in my office today. Please see my note below. \par \par Thank you very much for allowing me to participate in the care of this patient. If you have any questions, please do not hesitate to contact me. \par \par \par Respectfully,\par \par Shankar Cook M.D., FACS\par

## 2020-02-18 ENCOUNTER — APPOINTMENT (OUTPATIENT)
Dept: GASTROENTEROLOGY | Facility: CLINIC | Age: 65
End: 2020-02-18

## 2020-03-04 ENCOUNTER — APPOINTMENT (OUTPATIENT)
Dept: NEUROLOGY | Facility: CLINIC | Age: 65
End: 2020-03-04

## 2020-05-18 ENCOUNTER — OUTPATIENT (OUTPATIENT)
Dept: OUTPATIENT SERVICES | Facility: HOSPITAL | Age: 65
LOS: 1 days | Discharge: HOME | End: 2020-05-18

## 2020-05-18 ENCOUNTER — APPOINTMENT (OUTPATIENT)
Dept: HEMATOLOGY ONCOLOGY | Facility: CLINIC | Age: 65
End: 2020-05-18
Payer: COMMERCIAL

## 2020-05-18 ENCOUNTER — LABORATORY RESULT (OUTPATIENT)
Age: 65
End: 2020-05-18

## 2020-05-18 VITALS
WEIGHT: 232 LBS | HEART RATE: 77 BPM | SYSTOLIC BLOOD PRESSURE: 142 MMHG | HEIGHT: 61 IN | DIASTOLIC BLOOD PRESSURE: 88 MMHG | TEMPERATURE: 97.6 F | BODY MASS INDEX: 43.8 KG/M2 | RESPIRATION RATE: 14 BRPM

## 2020-05-18 DIAGNOSIS — K40.30 UNILATERAL INGUINAL HERNIA, WITH OBSTRUCTION, WITHOUT GANGRENE, NOT SPECIFIED AS RECURRENT: Chronic | ICD-10-CM

## 2020-05-18 DIAGNOSIS — D75.1 SECONDARY POLYCYTHEMIA: ICD-10-CM

## 2020-05-18 DIAGNOSIS — K56.609 UNSPECIFIED INTESTINAL OBSTRUCTION, UNSPECIFIED AS TO PARTIAL VERSUS COMPLETE OBSTRUCTION: Chronic | ICD-10-CM

## 2020-05-18 LAB
HCT VFR BLD CALC: 43.1 %
HGB BLD-MCNC: 14.7 G/DL
MCHC RBC-ENTMCNC: 29.6 PG
MCHC RBC-ENTMCNC: 34.1 G/DL
MCV RBC AUTO: 86.7 FL
PLATELET # BLD AUTO: 121 K/UL
PMV BLD: 11.1 FL
RBC # BLD: 4.97 M/UL
RBC # FLD: 12.3 %
WBC # FLD AUTO: 7.24 K/UL

## 2020-05-18 PROCEDURE — 99213 OFFICE O/P EST LOW 20 MIN: CPT

## 2020-05-18 RX ORDER — NIFEDIPINE 30 MG/1
30 TABLET, FILM COATED, EXTENDED RELEASE ORAL
Qty: 30 | Refills: 0 | Status: DISCONTINUED | COMMUNITY
Start: 2019-12-04 | End: 2020-05-18

## 2020-05-18 RX ORDER — NIFEDIPINE 20 MG/1
CAPSULE ORAL
Refills: 0 | Status: DISCONTINUED | COMMUNITY
End: 2020-05-18

## 2020-05-18 RX ORDER — NYSTATIN 100000 [USP'U]/ML
100000 SUSPENSION ORAL 3 TIMES DAILY
Qty: 1 | Refills: 0 | Status: DISCONTINUED | COMMUNITY
Start: 2020-01-09 | End: 2020-05-18

## 2020-05-18 RX ORDER — CLONAZEPAM 0.5 MG/1
0.5 TABLET ORAL
Refills: 0 | Status: ACTIVE | COMMUNITY

## 2020-05-18 RX ORDER — AMOXICILLIN AND CLAVULANATE POTASSIUM 875; 125 MG/1; MG/1
875-125 TABLET, COATED ORAL
Qty: 14 | Refills: 0 | Status: DISCONTINUED | COMMUNITY
Start: 2020-01-09 | End: 2020-05-18

## 2020-05-18 RX ORDER — FAMOTIDINE 40 MG/1
40 TABLET, FILM COATED ORAL
Qty: 90 | Refills: 2 | Status: ACTIVE | COMMUNITY
Start: 2019-12-19 | End: 1900-01-01

## 2020-05-18 RX ORDER — NYSTATIN 100000 [USP'U]/ML
100000 SUSPENSION ORAL
Qty: 140 | Refills: 0 | Status: DISCONTINUED | COMMUNITY
Start: 2019-12-11 | End: 2020-05-18

## 2020-05-19 NOTE — REVIEW OF SYSTEMS
[Negative] : Allergic/Immunologic [Vision Problems] : vision problems [Fever] : no fever [Chills] : no chills [Chest Pain] : no chest pain [Shortness Of Breath] : no shortness of breath [FreeTextEntry3] : blurry vision but changing eye glass prescription

## 2020-05-19 NOTE — HISTORY OF PRESENT ILLNESS
[de-identified] : Mr. DARNELL DANIELS is a 64 year old male here today for evaluation of erythrocytosis\par \par He is a 65 y/o male with pMHx of HTN was sent to ER by his PMD Dr. Simon for evaluation of elevated Hgb.  Pt reports he was having a headache the night before so he sent\par him at [facility] for blood work and was found to have hgb of 20.  Patient says his father had a history of Polycythemia Vera. He reported headache, dry mouth, intermittent fever/chills, intermittent itching, abdominal pain and generalized body aches started few days ago and progressively had gotten worse.  He reports using testosterone in past, quit using about 5 months ago . Reports that he feels headache with some flushing and pruritus especially after taking a hot shower.   He was phlebotomized once during hospitalization.  Last CBC from 12/11 demonstrated hgb of 17.2g/dL.  However, he went for phlebotomy at blood donation center again last week.  He states he sleeps well at night and went for negative sleep apnea study about 4-5 years ago.  \par \par LAB WORKUP \par (11.12.19) WBC 6.55, Hgb 18.5, Hct 56.5, \par (06.28.18) WBC 12.02, Hgb 17, Hct 49.4, \par (10.29.16) WBC 10.39, Hgb 18.2, Hct 54, MCV 87.4, \par  EPO as low, and [id number] negative\par HCM\par EGD done recently for dysphagia. GE junction bx showed intraepithelial eosinophils and reflex esophagitis; says he is supposed to get another EGD. [de-identified] : 5/18/2020\par Patient is here for a follow-up visit for erythrocytosis.  He is feeling well today with no new complaints.  Most recent CBC is stable.  Patient denies fever, chills, CP or itchiness after warm showers.  He has not taken any testosterone or donated blood for at least 6 months.  He does complain of recent worsening blurry vision but acknowledges he is changing vision prescriptions so he believes it is related to that.

## 2020-05-19 NOTE — END OF VISIT
[FreeTextEntry3] : He is doing well. His hgb was  14.7. Plts are 121 this is chronic. I explained  that his elevated RBC was due to testosterone. We went over possible risk of testosterone replacement. If he decides to restart i Recommended he either let us know and we can monitor his CBC or donate blood like he did before several times a year.  If his platelets  are <100,000 will consider additional testing than.

## 2020-05-19 NOTE — ASSESSMENT
[FreeTextEntry1] : 65 y/o male with elevated Hgb suspect was due to   prior testosterone use\par \par # Erythrocytosis, SHER 2 (-); EPO was low at 2.3;  No Family h/o polycythemia.   ( prior use of testosterone ), does not smoke, no carbon monoxide exposure, previous CBCs were fine. \par - Us abd reviewed , no splenomegaly , sleep apnea (-); Blood work not showing any thrombosis or elevated wbc\par - recommend sleep apnea testing to r/o other causes for his polycythemia\par - continue to abstain from testosterone supplementation\par - will continue to monitor; will asses for the need of repeating phlebotomies in future, explained that the only way to know if his Hgb was high due to testosterone is if he does not take it anymore and he hold off blood donations and we can trend his CBC.\par \par #Thrombocytopenia, mild; pt states it is not new\par - will continue to monitor, stable\par \par He can follow with PCP; otherwise, if he starts testosterone again, recommended close monitoring with phlebotomy and he can return to clinic

## 2020-05-26 DIAGNOSIS — D75.1 SECONDARY POLYCYTHEMIA: ICD-10-CM

## 2020-09-02 ENCOUNTER — OUTPATIENT (OUTPATIENT)
Dept: OUTPATIENT SERVICES | Facility: HOSPITAL | Age: 65
LOS: 1 days | Discharge: HOME | End: 2020-09-02
Payer: COMMERCIAL

## 2020-09-02 ENCOUNTER — RESULT REVIEW (OUTPATIENT)
Age: 65
End: 2020-09-02

## 2020-09-02 DIAGNOSIS — M79.641 PAIN IN RIGHT HAND: ICD-10-CM

## 2020-09-02 DIAGNOSIS — K56.609 UNSPECIFIED INTESTINAL OBSTRUCTION, UNSPECIFIED AS TO PARTIAL VERSUS COMPLETE OBSTRUCTION: Chronic | ICD-10-CM

## 2020-09-02 DIAGNOSIS — K40.30 UNILATERAL INGUINAL HERNIA, WITH OBSTRUCTION, WITHOUT GANGRENE, NOT SPECIFIED AS RECURRENT: Chronic | ICD-10-CM

## 2020-09-02 DIAGNOSIS — M79.642 PAIN IN LEFT HAND: ICD-10-CM

## 2020-09-02 PROCEDURE — 73120 X-RAY EXAM OF HAND: CPT | Mod: 26,50

## 2020-10-01 ENCOUNTER — APPOINTMENT (OUTPATIENT)
Dept: PLASTIC SURGERY | Facility: CLINIC | Age: 65
End: 2020-10-01
Payer: COMMERCIAL

## 2020-10-01 PROCEDURE — 99203 OFFICE O/P NEW LOW 30 MIN: CPT | Mod: 25

## 2020-10-01 PROCEDURE — 20550 NJX 1 TENDON SHEATH/LIGAMENT: CPT

## 2020-11-03 NOTE — ED PROVIDER NOTE - NS HIV RISK FACTOR YES
What Type Of Note Output Would You Prefer (Optional)?: Standard Output Hpi Title: Evaluation of Skin Lesions How Severe Are Your Spot(S)?: mild Have Your Spot(S) Been Treated In The Past?: has not been treated Declined

## 2020-11-05 ENCOUNTER — APPOINTMENT (OUTPATIENT)
Dept: PLASTIC SURGERY | Facility: CLINIC | Age: 65
End: 2020-11-05
Payer: COMMERCIAL

## 2020-11-05 PROCEDURE — 99212 OFFICE O/P EST SF 10 MIN: CPT | Mod: 25

## 2020-11-05 PROCEDURE — 99072 ADDL SUPL MATRL&STAF TM PHE: CPT

## 2020-11-05 PROCEDURE — 20550 NJX 1 TENDON SHEATH/LIGAMENT: CPT

## 2020-11-05 NOTE — ASSESSMENT
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

## 2020-11-05 NOTE — HISTORY OF PRESENT ILLNESS
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

## 2020-11-05 NOTE — HISTORY OF PRESENT ILLNESS
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

## 2020-11-05 NOTE — PHYSICAL EXAM
[NI] : Normal [de-identified] : triggering bilateral thumb and bilateral index finger  tender over A1 pullies

## 2020-11-05 NOTE — PHYSICAL EXAM
[NI] : Normal [de-identified] : triggering bilateral thumb and bilateral index finger  tender over A1 pullies

## 2020-11-05 NOTE — PHYSICAL EXAM
[NI] : Normal [de-identified] : triggering bilateral thumb and bilateral index finger  tender over A1 pullies

## 2021-01-01 NOTE — ASU PREOP CHECKLIST - NOTHING BY MOUTH SINCE
30-Jan-2020 20:00 Continued growth and development F/U with PMD in 1-2 days  Feed Q2-3 hours and on demand Follow up with Pediatrician in 1-2 days  Breastfeeding on demand, at least every 3 hours  Monitor diapers

## 2021-06-17 ENCOUNTER — APPOINTMENT (OUTPATIENT)
Dept: SURGERY | Facility: CLINIC | Age: 66
End: 2021-06-17
Payer: COMMERCIAL

## 2021-06-17 VITALS — HEIGHT: 73 IN | WEIGHT: 225 LBS | BODY MASS INDEX: 29.82 KG/M2

## 2021-06-17 DIAGNOSIS — S76.219A STRAIN OF ADDUCTOR MUSCLE, FASCIA AND TENDON OF UNSPECIFIED THIGH, INITIAL ENCOUNTER: ICD-10-CM

## 2021-06-17 PROCEDURE — 99213 OFFICE O/P EST LOW 20 MIN: CPT

## 2021-06-17 NOTE — CONSULT LETTER
[FreeTextEntry1] : Dear Dr. Abdoulaye Simon, \par \par I had the pleasure of seeing your patient, DARNELL DANIELS, in my office today. Please see my note below. \par \par Thank you very much for allowing me to participate in the care of this patient. If you have any questions, please do not hesitate to contact me. \par \par \par Respectfully,\par \par Shankar Cook M.D., FACS

## 2021-06-17 NOTE — ASSESSMENT
[FreeTextEntry1] : Jh presents back to the office approximately a year and a half after the repair of his recurrent left inguinal hernia with mesh with concerns about pain in the left groin which began recently [place] a [place] for many days in a row concerning for a possible hernia recurrence.\par \par Physical examination demonstrates a well healed scar with no evidence of hernia recurrence or delayed wound complications in the left groin. Examination of his right groin demonstrates a mild weakness but no obvious hernia. Both testicles are normal. His abdominal examination is unremarkable with no ventral or incisional or umbilical hernias. He is moderately overweight with a current BMI of 30.\par \par Jh was counseled and reassured. I believe he sustained a significant muscle strain due to overexertion and I recommended alternating ice/heat therapy and nonsteroidal anti-inflammatory medication when necessary, and avoiding strenuous physical activity whenever possible the next several weeks. I believe his symptoms will improve with time. He was encouraged to return to me in the future if these symptoms persist or worsen, of course.

## 2021-06-17 NOTE — PHYSICAL EXAM
[JVD] : no jugular venous distention  [Normal Breath Sounds] : Normal breath sounds [No Rash or Lesion] : No rash or lesion [Alert] : alert [Calm] : calm [de-identified] : overweight [de-identified] : normal [de-identified] : mildly protuberant abdomen, mild diastasis recti\par \par  [de-identified] : normal testicles [de-identified] : no hernia recurrence

## 2022-06-01 NOTE — ED ADULT NURSE NOTE - SUICIDE SCREENING QUESTION 1
Patient unable to complete Qbrexza Pregnancy And Lactation Text: There is no available data on Qbrexza use in pregnant women.  There is no available data on Qbrexza use in lactation.

## 2022-09-02 NOTE — ED PROVIDER NOTE - NS HIV RISK FACTOR YES
Caller would like a Return call today, 09/02/2022, from Dr. Perry Comer to discuss if she should start taking a baby aspirin daily as advised on the recent hospital discharge papers from 08/31/2022. Please advise.  Patient Name: Serenity Wong  Caller Name: Serenity  Name of Facility: n/a  Callback Number: [phone] (H)  Best Availability: today please  Can A Detailed Message Be left? yes  Did you confirm the message with the caller?: yes  Thank you,  Dorene Monroe   Declined

## 2022-10-07 NOTE — CONSULT NOTE ADULT - REASON FOR ADMISSION
[Today's Date] : [unfilled]
[FreeTextEntry1] : Normal sinus rhythm, normal QRS axis, normal intervals (QTc 412 msec), no hypertrophy, no pre-excitation, no ST segment or T wave abnormalities. Normal EKG.
Elevated Hgb

## 2024-04-14 ENCOUNTER — INPATIENT (INPATIENT)
Facility: HOSPITAL | Age: 69
LOS: 2 days | Discharge: HOME | DRG: 392 | End: 2024-04-17
Attending: INTERNAL MEDICINE | Admitting: INTERNAL MEDICINE
Payer: MEDICARE

## 2024-04-14 VITALS
SYSTOLIC BLOOD PRESSURE: 137 MMHG | RESPIRATION RATE: 19 BRPM | WEIGHT: 222.01 LBS | OXYGEN SATURATION: 99 % | DIASTOLIC BLOOD PRESSURE: 76 MMHG | HEART RATE: 81 BPM | TEMPERATURE: 99 F

## 2024-04-14 DIAGNOSIS — K56.609 UNSPECIFIED INTESTINAL OBSTRUCTION, UNSPECIFIED AS TO PARTIAL VERSUS COMPLETE OBSTRUCTION: Chronic | ICD-10-CM

## 2024-04-14 DIAGNOSIS — R33.9 RETENTION OF URINE, UNSPECIFIED: ICD-10-CM

## 2024-04-14 DIAGNOSIS — K40.30 UNILATERAL INGUINAL HERNIA, WITH OBSTRUCTION, WITHOUT GANGRENE, NOT SPECIFIED AS RECURRENT: Chronic | ICD-10-CM

## 2024-04-14 LAB
ALBUMIN SERPL ELPH-MCNC: 4.5 G/DL — SIGNIFICANT CHANGE UP (ref 3.5–5.2)
ALP SERPL-CCNC: 81 U/L — SIGNIFICANT CHANGE UP (ref 30–115)
ALT FLD-CCNC: 22 U/L — SIGNIFICANT CHANGE UP (ref 0–41)
ANION GAP SERPL CALC-SCNC: 11 MMOL/L — SIGNIFICANT CHANGE UP (ref 7–14)
APPEARANCE UR: CLEAR — SIGNIFICANT CHANGE UP
AST SERPL-CCNC: 22 U/L — SIGNIFICANT CHANGE UP (ref 0–41)
BASOPHILS # BLD AUTO: 0.04 K/UL — SIGNIFICANT CHANGE UP (ref 0–0.2)
BASOPHILS NFR BLD AUTO: 0.2 % — SIGNIFICANT CHANGE UP (ref 0–1)
BILIRUB SERPL-MCNC: 0.6 MG/DL — SIGNIFICANT CHANGE UP (ref 0.2–1.2)
BILIRUB UR-MCNC: NEGATIVE — SIGNIFICANT CHANGE UP
BUN SERPL-MCNC: 16 MG/DL — SIGNIFICANT CHANGE UP (ref 10–20)
CALCIUM SERPL-MCNC: 9.7 MG/DL — SIGNIFICANT CHANGE UP (ref 8.4–10.5)
CHLORIDE SERPL-SCNC: 100 MMOL/L — SIGNIFICANT CHANGE UP (ref 98–110)
CO2 SERPL-SCNC: 22 MMOL/L — SIGNIFICANT CHANGE UP (ref 17–32)
COLOR SPEC: YELLOW — SIGNIFICANT CHANGE UP
CREAT SERPL-MCNC: 1 MG/DL — SIGNIFICANT CHANGE UP (ref 0.7–1.5)
DIFF PNL FLD: NEGATIVE — SIGNIFICANT CHANGE UP
EGFR: 83 ML/MIN/1.73M2 — SIGNIFICANT CHANGE UP
EOSINOPHIL # BLD AUTO: 0.03 K/UL — SIGNIFICANT CHANGE UP (ref 0–0.7)
EOSINOPHIL NFR BLD AUTO: 0.2 % — SIGNIFICANT CHANGE UP (ref 0–8)
GLUCOSE SERPL-MCNC: 127 MG/DL — HIGH (ref 70–99)
GLUCOSE UR QL: NEGATIVE MG/DL — SIGNIFICANT CHANGE UP
HCT VFR BLD CALC: 43.5 % — SIGNIFICANT CHANGE UP (ref 42–52)
HGB BLD-MCNC: 15.5 G/DL — SIGNIFICANT CHANGE UP (ref 14–18)
IMM GRANULOCYTES NFR BLD AUTO: 0.4 % — HIGH (ref 0.1–0.3)
KETONES UR-MCNC: ABNORMAL MG/DL
LEUKOCYTE ESTERASE UR-ACNC: NEGATIVE — SIGNIFICANT CHANGE UP
LIDOCAIN IGE QN: 25 U/L — SIGNIFICANT CHANGE UP (ref 7–60)
LYMPHOCYTES # BLD AUTO: 1.13 K/UL — LOW (ref 1.2–3.4)
LYMPHOCYTES # BLD AUTO: 6.5 % — LOW (ref 20.5–51.1)
MCHC RBC-ENTMCNC: 29.5 PG — SIGNIFICANT CHANGE UP (ref 27–31)
MCHC RBC-ENTMCNC: 35.6 G/DL — SIGNIFICANT CHANGE UP (ref 32–37)
MCV RBC AUTO: 82.9 FL — SIGNIFICANT CHANGE UP (ref 80–94)
MONOCYTES # BLD AUTO: 0.73 K/UL — HIGH (ref 0.1–0.6)
MONOCYTES NFR BLD AUTO: 4.2 % — SIGNIFICANT CHANGE UP (ref 1.7–9.3)
NEUTROPHILS # BLD AUTO: 15.4 K/UL — HIGH (ref 1.4–6.5)
NEUTROPHILS NFR BLD AUTO: 88.5 % — HIGH (ref 42.2–75.2)
NITRITE UR-MCNC: NEGATIVE — SIGNIFICANT CHANGE UP
NRBC # BLD: 0 /100 WBCS — SIGNIFICANT CHANGE UP (ref 0–0)
PH UR: 7 — SIGNIFICANT CHANGE UP (ref 5–8)
PLATELET # BLD AUTO: 146 K/UL — SIGNIFICANT CHANGE UP (ref 130–400)
PMV BLD: 10.7 FL — HIGH (ref 7.4–10.4)
POTASSIUM SERPL-MCNC: 4.4 MMOL/L — SIGNIFICANT CHANGE UP (ref 3.5–5)
POTASSIUM SERPL-SCNC: 4.4 MMOL/L — SIGNIFICANT CHANGE UP (ref 3.5–5)
PROT SERPL-MCNC: 6.6 G/DL — SIGNIFICANT CHANGE UP (ref 6–8)
PROT UR-MCNC: NEGATIVE MG/DL — SIGNIFICANT CHANGE UP
RBC # BLD: 5.25 M/UL — SIGNIFICANT CHANGE UP (ref 4.7–6.1)
RBC # FLD: 12 % — SIGNIFICANT CHANGE UP (ref 11.5–14.5)
SODIUM SERPL-SCNC: 133 MMOL/L — LOW (ref 135–146)
SP GR SPEC: 1.02 — SIGNIFICANT CHANGE UP (ref 1–1.03)
UROBILINOGEN FLD QL: 1 MG/DL — SIGNIFICANT CHANGE UP (ref 0.2–1)
WBC # BLD: 17.4 K/UL — HIGH (ref 4.8–10.8)
WBC # FLD AUTO: 17.4 K/UL — HIGH (ref 4.8–10.8)

## 2024-04-14 PROCEDURE — 36415 COLL VENOUS BLD VENIPUNCTURE: CPT

## 2024-04-14 PROCEDURE — 76770 US EXAM ABDO BACK WALL COMP: CPT

## 2024-04-14 PROCEDURE — 85730 THROMBOPLASTIN TIME PARTIAL: CPT

## 2024-04-14 PROCEDURE — 80053 COMPREHEN METABOLIC PANEL: CPT

## 2024-04-14 PROCEDURE — 99221 1ST HOSP IP/OBS SF/LOW 40: CPT

## 2024-04-14 PROCEDURE — 85610 PROTHROMBIN TIME: CPT

## 2024-04-14 PROCEDURE — 74176 CT ABD & PELVIS W/O CONTRAST: CPT | Mod: MC

## 2024-04-14 PROCEDURE — 99285 EMERGENCY DEPT VISIT HI MDM: CPT | Mod: FS

## 2024-04-14 PROCEDURE — 87086 URINE CULTURE/COLONY COUNT: CPT

## 2024-04-14 PROCEDURE — 74177 CT ABD & PELVIS W/CONTRAST: CPT | Mod: 26,MC

## 2024-04-14 PROCEDURE — 85025 COMPLETE CBC W/AUTO DIFF WBC: CPT

## 2024-04-14 RX ORDER — MORPHINE SULFATE 50 MG/1
2 CAPSULE, EXTENDED RELEASE ORAL EVERY 4 HOURS
Refills: 0 | Status: DISCONTINUED | OUTPATIENT
Start: 2024-04-14 | End: 2024-04-15

## 2024-04-14 RX ORDER — HEPARIN SODIUM 5000 [USP'U]/ML
5000 INJECTION INTRAVENOUS; SUBCUTANEOUS EVERY 12 HOURS
Refills: 0 | Status: DISCONTINUED | OUTPATIENT
Start: 2024-04-14 | End: 2024-04-15

## 2024-04-14 RX ORDER — NIFEDIPINE 30 MG
1 TABLET, EXTENDED RELEASE 24 HR ORAL
Qty: 0 | Refills: 0 | DISCHARGE

## 2024-04-14 RX ORDER — LACTULOSE 10 G/15ML
20 SOLUTION ORAL ONCE
Refills: 0 | Status: COMPLETED | OUTPATIENT
Start: 2024-04-14 | End: 2024-04-14

## 2024-04-14 RX ORDER — METRONIDAZOLE 500 MG
500 TABLET ORAL ONCE
Refills: 0 | Status: COMPLETED | OUTPATIENT
Start: 2024-04-14 | End: 2024-04-14

## 2024-04-14 RX ORDER — CLONAZEPAM 1 MG
0.5 TABLET ORAL ONCE
Refills: 0 | Status: DISCONTINUED | OUTPATIENT
Start: 2024-04-14 | End: 2024-04-14

## 2024-04-14 RX ORDER — ONDANSETRON 8 MG/1
4 TABLET, FILM COATED ORAL EVERY 8 HOURS
Refills: 0 | Status: DISCONTINUED | OUTPATIENT
Start: 2024-04-14 | End: 2024-04-16

## 2024-04-14 RX ORDER — CIPROFLOXACIN LACTATE 400MG/40ML
400 VIAL (ML) INTRAVENOUS ONCE
Refills: 0 | Status: COMPLETED | OUTPATIENT
Start: 2024-04-14 | End: 2024-04-14

## 2024-04-14 RX ORDER — ATORVASTATIN CALCIUM 80 MG/1
20 TABLET, FILM COATED ORAL AT BEDTIME
Refills: 0 | Status: DISCONTINUED | OUTPATIENT
Start: 2024-04-14 | End: 2024-04-15

## 2024-04-14 RX ORDER — SODIUM CHLORIDE 9 MG/ML
1000 INJECTION INTRAMUSCULAR; INTRAVENOUS; SUBCUTANEOUS ONCE
Refills: 0 | Status: COMPLETED | OUTPATIENT
Start: 2024-04-14 | End: 2024-04-14

## 2024-04-14 RX ORDER — CIPROFLOXACIN LACTATE 400MG/40ML
400 VIAL (ML) INTRAVENOUS EVERY 12 HOURS
Refills: 0 | Status: DISCONTINUED | OUTPATIENT
Start: 2024-04-15 | End: 2024-04-17

## 2024-04-14 RX ORDER — SODIUM CHLORIDE 9 MG/ML
1000 INJECTION, SOLUTION INTRAVENOUS
Refills: 0 | Status: DISCONTINUED | OUTPATIENT
Start: 2024-04-14 | End: 2024-04-15

## 2024-04-14 RX ORDER — CLONAZEPAM 1 MG
1 TABLET ORAL
Qty: 0 | Refills: 0 | DISCHARGE

## 2024-04-14 RX ORDER — ATORVASTATIN CALCIUM 80 MG/1
1 TABLET, FILM COATED ORAL
Qty: 0 | Refills: 0 | DISCHARGE

## 2024-04-14 RX ORDER — METRONIDAZOLE 500 MG
500 TABLET ORAL EVERY 8 HOURS
Refills: 0 | Status: DISCONTINUED | OUTPATIENT
Start: 2024-04-14 | End: 2024-04-17

## 2024-04-14 RX ORDER — ACETAMINOPHEN 500 MG
650 TABLET ORAL EVERY 6 HOURS
Refills: 0 | Status: DISCONTINUED | OUTPATIENT
Start: 2024-04-14 | End: 2024-04-17

## 2024-04-14 RX ORDER — CHLORHEXIDINE GLUCONATE 213 G/1000ML
1 SOLUTION TOPICAL
Refills: 0 | Status: DISCONTINUED | OUTPATIENT
Start: 2024-04-14 | End: 2024-04-17

## 2024-04-14 RX ORDER — MINERAL OIL
133 OIL (ML) MISCELLANEOUS ONCE
Refills: 0 | Status: COMPLETED | OUTPATIENT
Start: 2024-04-14 | End: 2024-04-14

## 2024-04-14 RX ORDER — LANOLIN ALCOHOL/MO/W.PET/CERES
5 CREAM (GRAM) TOPICAL AT BEDTIME
Refills: 0 | Status: DISCONTINUED | OUTPATIENT
Start: 2024-04-14 | End: 2024-04-17

## 2024-04-14 RX ADMIN — Medication 100 MILLIGRAM(S): at 23:28

## 2024-04-14 RX ADMIN — LACTULOSE 20 GRAM(S): 10 SOLUTION ORAL at 19:03

## 2024-04-14 RX ADMIN — Medication 200 MILLIGRAM(S): at 23:28

## 2024-04-14 RX ADMIN — Medication 133 MILLILITER(S): at 19:26

## 2024-04-14 RX ADMIN — SODIUM CHLORIDE 2000 MILLILITER(S): 9 INJECTION INTRAMUSCULAR; INTRAVENOUS; SUBCUTANEOUS at 19:03

## 2024-04-14 RX ADMIN — Medication 0.5 MILLIGRAM(S): at 23:45

## 2024-04-14 NOTE — ED PROVIDER NOTE - OBJECTIVE STATEMENT
68-year-old male with past medical history of hypertension, acidophilic esophagitis, and incarcerated hernia who presents to the ED with constipation and urinary retention.  Reports that the last full bowel movement and full urination that he had was about 2 days ago.  Reports that he has not been able to have any bowel movement for the past 2 days until earlier today when he had very small amount of stool leakage after having Colace and over-the-counter suppository.  Also endorses suprapubic fullness.  Denies fever, shortness of breath, chest pain, nausea, vomiting, hematuria, dysuria, and melena/hematochezia.

## 2024-04-14 NOTE — ED PROVIDER NOTE - CLINICAL SUMMARY MEDICAL DECISION MAKING FREE TEXT BOX
Patient with urinary retention as well as inability to have bowel movement.  Mack catheter was placed with about 700 cc urine output.  Enema was given and manual disimpaction was done.  Patient was able to have bowel movement.  White blood cell count was elevated so we obtained CT scan.  Patient found to have inflammatory changes surrounding the rectum suspicious for infection or inflammation.  There were also scattered foci of air within the rectal wall.  Surgery was consulted who recommends to administer IV antibiotics and repeat CT scan with oral contrast tomorrow.  I discussed results with patient and his wife.  Will admit at this time.

## 2024-04-14 NOTE — H&P ADULT - ASSESSMENT
68-year-old male with past medical history of hypertension, acidophilic esophagitis, recent oral/ dental infection  s/p  abx  rx, and h/o  incarcerated hernia -supra umbilical/  bl inguinal  s/p  repair more than  7 yrs  ago by DR CHRISTINA,  who presents to the ED with constipation and urinary retention.  Reports that the last full bowel movement and full urination that he had was about 2 days ago.  Reports that he has not been able to have any bowel movement for the past 2 days until earlier today when he had very small amount of stool leakage after having Colace and over-the-counter suppository, admits to  large  bms  in ed,  Also endorses suprapubic fullness, hendricks placed  in ed 800 cc straw  colored  urine output   Denies fever, shortness of breath, chest pain, nausea, vomiting, hematuria, dysuria, and melena/hematochezia.        # Colitis   - NPO  - abx  - repeat CT in am per surg  - pain management      # HTN  - VS  - c/w home med    # HLD  - c/w home med
Initial (On Arrival)

## 2024-04-14 NOTE — ED PROVIDER NOTE - ATTENDING APP SHARED VISIT CONTRIBUTION OF CARE
68-year-old male past medical history hypertension, history of incarcerated hernia with surgical repair with mesh presents for evaluation of difficulty urinating for the last 2 days as well as constipation.  Patient's wife gave him Colace and suppository.  Patient however on active stool.  No fever or chills, no nausea or vomiting, on exam patient in NAD, AAOx3, OP clear, MMM, abdomen is tender over suprapubic area with fullness of the bladder palpated, positive surgical scar, positive bowel sounds present, no rash

## 2024-04-14 NOTE — CONSULT NOTE ADULT - ASSESSMENT
COLITIS  CT--IV CONTRAST  IMPRESSION:  Inflammatory changes surrounding the rectum suspicious for infectious or   inflammatory etiology. Scattered foci are air within the rectal wall;   developing pneumatosis is not excluded and follow-up is recommended  D/W  DR PEARCE  NPO  IVF  IV  ABX  CIPRO/ FLAGYL  PAIN MGMT  ZOFRAN PRN  REPEAT   CT  A&P WITH  PO  CONTRAST  IN AM  DVT/ GI PROPHYLAXIS  WILL FOLLOW

## 2024-04-14 NOTE — ED PROVIDER NOTE - PHYSICAL EXAMINATION
CONSTITUTIONAL: In mild distress.   ENT: Hearing is intact with good acuity to spoken voice.  Patient is speaking clearly, not muffled and airway is intact.   RESPIRATORY: No signs of respiratory distress. Lung sounds are clear in all lobes bilaterally without rales, rhonchi, or wheezes.  CARDIOVASCULAR: Regular rate and rhythm.   GI: mild tenderness to palpation in the suprapubic area. Abdomen is soft, and without distention. Bowel sounds are present and normoactive in all four quadrants. No masses are noted.   Rectal: Chaperone: LORY Nix. Normal rectal sphincter tone. No external masses or lesions. No bright red blood or melena noticed. Fecal impaction noticed.   NEURO: A & O x 3. Normal speech. No focal deficit.  PSYCHOLOGICAL: Appropriate mood and affect. Good judgement and insight.

## 2024-04-14 NOTE — ED PROVIDER NOTE - PROGRESS NOTE DETAILS
Patient seen by surgical team.  Plan is for admission.  Patient is requesting his nighttime meds which include Klonopin and statin.  Patient also gets isosorbide at night.  Awaiting repeat blood pressure prior to administering this medication.

## 2024-04-14 NOTE — CONSULT NOTE ADULT - SUBJECTIVE AND OBJECTIVE BOX
· HPI Objective Statement: 68-year-old male with past medical history of hypertension, acidophilic esophagitis, recent oral/ dental infection  s/p  abx  rx, and h/o  incarcerated hernia -supra umbilical/  bl inguinal  s/p  repair more than  7 yrs  ago by DR CHRISTINA,  who presents to the ED with constipation and urinary retention.  Reports that the last full bowel movement and full urination that he had was about 2 days ago.  Reports that he has not been able to have any bowel movement for the past 2 days until earlier today when he had very small amount of stool leakage after having Colace and over-the-counter suppository, admits to  large  bms  in ed,  Also endorses suprapubic fullness, faye placed  in ed 800 cc straw  colored  urine output   Denies fever, shortness of breath, chest pain, nausea, vomiting, hematuria, dysuria, and melena/hematochezia.    HIV:    HIV Test Questions:  · In accordance with [place] State law, we offer every patient who comes to our ED an HIV test. Would you like to be tested today?	Opt out    PAST MEDICAL/SURGICAL/FAMILY/SOCIAL HISTORY:    Past Medical, Past Surgical, and Family History:  PAST MEDICAL HISTORY:  Eosinophilic esophagitis     HTN (hypertension).   h/o dental infection  1 wk ago,     PAST SURGICAL HISTORY:  Intestinal obstruction   supraumbilical hernia  repair  10 yr  ago  Strangulated b/l  inguinal hernia. repair- 7 yr  ago    FAMILY HISTORY:  Family history of bone cancer, mother; type not known.  Family history of heart disease, father  Family history of polycythemia vera, father.     Tobacco Usage:  · Tobacco Usage	Unknown if ever smoked    ALLERGIES AND HOME MEDICATIONS:   Allergies:        Allergies:  	No Known Allergies:     Home Medications:   * Patient Currently Takes Medications as of 2024 18:30 documented in Structured Notes  · 	DilTIAZem (Eqv-Cardizem CD) 240 mg/24 hours oral capsule, extended release: Last Dose Taken:  , 1 cap(s) orally  · 	aspirin 81 mg oral tablet: Last Dose Taken:  , orally  · 	clonazePAM 0.125 mg oral tablet: Last Dose Taken:  , orally  · 	isosorbide mononitrate 30 mg oral tablet, extended release: Last Dose Taken:  , 1 tab(s) orally  · 	Plavix 75 mg oral tablet: 1 tab(s) orally  · 	rosuvastatin 20 mg oral tablet: 1 tab(s) orally    PHYSICAL EXAM:   · Physical Examination: CONSTITUTIONAL:seen in  ED  in [facility]  	ENT: Hearing is intact with good acuity to spoken voice.  Patient is speaking clearly, not muffled and airway is intact.   	RESPIRATORY: No signs of respiratory distress. Lung sounds are clear in all lobes bilaterally without rales, rhonchi, or wheezes.  	CARDIOVASCULAR: Regular rate and rhythm.   	GI: mild tenderness to palpation in the suprapubic area. Abdomen is soft, and without distention. Bowel sounds are present and normoactive in all four quadrants. No masses are noted.    FAYE CATH  IN PLACE    	Rectal: Chaperone: PCA Dinah. Normal rectal sphincter tone. No external masses or lesions. No bright red blood or melena noticed. Fecal impaction noticed.   	NEURO: A & O x 3. Normal speech. No focal deficit.  PSYCHOLOGICAL: Appropriate mood and affect. Good judgement and insight.                        15.5   17.40 )-----------( 146      ( 2024 19:00 )             43.5   -    133<L>  |  100  |  16  ----------------------------<  127<H>  4.4   |  22  |  1.0    Ca    9.7      2024 19:00    TPro  6.6  /  Alb  4.5  /  TBili  0.6  /  DBili  x   /  AST  22  /  ALT  22  /  AlkPhos  81  04-14  Urinalysis Basic - ( 2024 20:00 )    Color: Yellow / Appearance: Clear / S.023 / pH: x  Gluc: x / Ketone: Trace mg/dL  / Bili: Negative / Urobili: 1.0 mg/dL   Blood: x / Protein: Negative mg/dL / Nitrite: Negative   Leuk Esterase: Negative / RBC: x / WBC x   Sq Epi: x / Non Sq Epi: x / Bacteria: x  ACC: [id number] EXAM:  CT ABDOMEN AND PELVIS IC   ORDERED BY: MAGGIE HALL     PROCEDURE DATE:  2024          INTERPRETATION:  CLINICAL STATEMENT: Constipation    TECHNIQUE: Contiguous axial CT images were obtained of the abdomen and   pelvis following administration of intravenous contrast.  Oral contrast   was not administered. Reformatted images in the coronal and sagittal   planes were acquired.    COMPARISON CT: None    FINDINGS:    LOWER CHEST: Bibasilar subsegmental atelectasis.    HEPATOBILIARY: Unremarkable.    SPLEEN: Within normal limits.    ADRENALS: Within normal limits.    PANCREAS: Within normal limits.    KIDNEYS: Symmetric renal enhancement. No hydronephrosis.    ABDOMINOPELVIC NODES: No enlarged abdominal or pelvic lymph nodes.    PELVIC ORGANS: Underdistended urinary bladder a Faye catheter, limiting   evaluation. Intraluminal air is noted    PERITONEUM/MESENTERY/BOWEL: No bowel obstruction, ascites or free   intraperitoneal air. The appendix is not definitely identified, however   there is no pericecal inflammation to suggest acute appendicitis.     Inflammatory changes surrounding the rectum. There is air within the   rectal wall , 302/285    BONES/SOFT TISSUES: Degenerative changes of the spine.  Postsurgical   changes anterior abdominal wall      IMPRESSION:  Inflammatory changes surrounding the rectum suspicious for infectious or   inflammatory etiology. Scattered foci are air within the rectal wall;   developing pneumatosis is not excluded and follow-up is recommended    Underdistended urinary bladder surrounding a Faye catheter, limiting   evaluation. Intraluminal air is noted which may be related to   instrumentation    --- End of Report ---            NORAH GOYAL MD; Attending Radiologist  This document has been electronically signed. 2024 10:10PM

## 2024-04-14 NOTE — H&P ADULT - HISTORY OF PRESENT ILLNESS
68-year-old male with past medical history of hypertension, acidophilic esophagitis, recent oral/ dental infection  s/p  abx  rx, and h/o  incarcerated hernia -supra umbilical/  bl inguinal  s/p  repair more than  7 yrs  ago by DR CHRISTINA,  who presents to the ED with constipation and urinary retention.  Reports that the last full bowel movement and full urination that he had was about 2 days ago.  Reports that he has not been able to have any bowel movement for the past 2 days until earlier today when he had very small amount of stool leakage after having Colace and over-the-counter suppository, admits to  large  bms  in ed,  Also endorses suprapubic fullness, hendricks placed  in ed 800 cc straw  colored  urine output   Denies fever, shortness of breath, chest pain, nausea, vomiting, hematuria, dysuria, and melena/hematochezia.

## 2024-04-14 NOTE — ED PROVIDER NOTE - IV ALTEPLASE INCLUSION HIDDEN
show This was a shared visit with the ALEX. I reviewed and verified the documentation and independently performed the documented:

## 2024-04-14 NOTE — ED ADULT NURSE NOTE - NSFALLUNIVINTERV_ED_ALL_ED
Bed/Stretcher in lowest position, wheels locked, appropriate side rails in place/Call bell, personal items and telephone in reach/Instruct patient to call for assistance before getting out of bed/chair/stretcher/Non-slip footwear applied when patient is off stretcher/[place] to call system/Physically safe environment - no spills, clutter or unnecessary equipment/Purposeful proactive rounding/Room/bathroom lighting operational, light cord in reach No

## 2024-04-15 LAB
ALBUMIN SERPL ELPH-MCNC: 4.1 G/DL — SIGNIFICANT CHANGE UP (ref 3.5–5.2)
ALP SERPL-CCNC: 73 U/L — SIGNIFICANT CHANGE UP (ref 30–115)
ALT FLD-CCNC: 18 U/L — SIGNIFICANT CHANGE UP (ref 0–41)
ANION GAP SERPL CALC-SCNC: 11 MMOL/L — SIGNIFICANT CHANGE UP (ref 7–14)
APTT BLD: 27.8 SEC — SIGNIFICANT CHANGE UP (ref 27–39.2)
AST SERPL-CCNC: 16 U/L — SIGNIFICANT CHANGE UP (ref 0–41)
BASOPHILS # BLD AUTO: 0.02 K/UL — SIGNIFICANT CHANGE UP (ref 0–0.2)
BASOPHILS NFR BLD AUTO: 0.2 % — SIGNIFICANT CHANGE UP (ref 0–1)
BILIRUB SERPL-MCNC: 0.9 MG/DL — SIGNIFICANT CHANGE UP (ref 0.2–1.2)
BUN SERPL-MCNC: 10 MG/DL — SIGNIFICANT CHANGE UP (ref 10–20)
CALCIUM SERPL-MCNC: 9 MG/DL — SIGNIFICANT CHANGE UP (ref 8.4–10.5)
CHLORIDE SERPL-SCNC: 106 MMOL/L — SIGNIFICANT CHANGE UP (ref 98–110)
CO2 SERPL-SCNC: 24 MMOL/L — SIGNIFICANT CHANGE UP (ref 17–32)
CREAT SERPL-MCNC: 0.9 MG/DL — SIGNIFICANT CHANGE UP (ref 0.7–1.5)
EGFR: 94 ML/MIN/1.73M2 — SIGNIFICANT CHANGE UP
EOSINOPHIL # BLD AUTO: 0.14 K/UL — SIGNIFICANT CHANGE UP (ref 0–0.7)
EOSINOPHIL NFR BLD AUTO: 1.4 % — SIGNIFICANT CHANGE UP (ref 0–8)
GLUCOSE SERPL-MCNC: 121 MG/DL — HIGH (ref 70–99)
HCT VFR BLD CALC: 42.7 % — SIGNIFICANT CHANGE UP (ref 42–52)
HGB BLD-MCNC: 15.1 G/DL — SIGNIFICANT CHANGE UP (ref 14–18)
IMM GRANULOCYTES NFR BLD AUTO: 0.4 % — HIGH (ref 0.1–0.3)
INR BLD: 1.21 RATIO — SIGNIFICANT CHANGE UP (ref 0.65–1.3)
LYMPHOCYTES # BLD AUTO: 1.55 K/UL — SIGNIFICANT CHANGE UP (ref 1.2–3.4)
LYMPHOCYTES # BLD AUTO: 15.2 % — LOW (ref 20.5–51.1)
MCHC RBC-ENTMCNC: 29.5 PG — SIGNIFICANT CHANGE UP (ref 27–31)
MCHC RBC-ENTMCNC: 35.4 G/DL — SIGNIFICANT CHANGE UP (ref 32–37)
MCV RBC AUTO: 83.6 FL — SIGNIFICANT CHANGE UP (ref 80–94)
MONOCYTES # BLD AUTO: 0.81 K/UL — HIGH (ref 0.1–0.6)
MONOCYTES NFR BLD AUTO: 7.9 % — SIGNIFICANT CHANGE UP (ref 1.7–9.3)
NEUTROPHILS # BLD AUTO: 7.65 K/UL — HIGH (ref 1.4–6.5)
NEUTROPHILS NFR BLD AUTO: 74.9 % — SIGNIFICANT CHANGE UP (ref 42.2–75.2)
NRBC # BLD: 0 /100 WBCS — SIGNIFICANT CHANGE UP (ref 0–0)
PLATELET # BLD AUTO: 122 K/UL — LOW (ref 130–400)
PMV BLD: 10.6 FL — HIGH (ref 7.4–10.4)
POTASSIUM SERPL-MCNC: 4 MMOL/L — SIGNIFICANT CHANGE UP (ref 3.5–5)
POTASSIUM SERPL-SCNC: 4 MMOL/L — SIGNIFICANT CHANGE UP (ref 3.5–5)
PROT SERPL-MCNC: 6 G/DL — SIGNIFICANT CHANGE UP (ref 6–8)
PROTHROM AB SERPL-ACNC: 13.8 SEC — HIGH (ref 9.95–12.87)
RBC # BLD: 5.11 M/UL — SIGNIFICANT CHANGE UP (ref 4.7–6.1)
RBC # FLD: 12.5 % — SIGNIFICANT CHANGE UP (ref 11.5–14.5)
SODIUM SERPL-SCNC: 141 MMOL/L — SIGNIFICANT CHANGE UP (ref 135–146)
WBC # BLD: 10.21 K/UL — SIGNIFICANT CHANGE UP (ref 4.8–10.8)
WBC # FLD AUTO: 10.21 K/UL — SIGNIFICANT CHANGE UP (ref 4.8–10.8)

## 2024-04-15 PROCEDURE — 99232 SBSQ HOSP IP/OBS MODERATE 35: CPT

## 2024-04-15 PROCEDURE — 99223 1ST HOSP IP/OBS HIGH 75: CPT

## 2024-04-15 PROCEDURE — 74176 CT ABD & PELVIS W/O CONTRAST: CPT | Mod: 26

## 2024-04-15 PROCEDURE — 76770 US EXAM ABDO BACK WALL COMP: CPT | Mod: 26

## 2024-04-15 RX ORDER — ATORVASTATIN CALCIUM 80 MG/1
80 TABLET, FILM COATED ORAL AT BEDTIME
Refills: 0 | Status: DISCONTINUED | OUTPATIENT
Start: 2024-04-15 | End: 2024-04-17

## 2024-04-15 RX ORDER — SENNA PLUS 8.6 MG/1
2 TABLET ORAL AT BEDTIME
Refills: 0 | Status: DISCONTINUED | OUTPATIENT
Start: 2024-04-15 | End: 2024-04-17

## 2024-04-15 RX ORDER — POLYETHYLENE GLYCOL 3350 17 G/17G
17 POWDER, FOR SOLUTION ORAL
Refills: 0 | Status: DISCONTINUED | OUTPATIENT
Start: 2024-04-15 | End: 2024-04-17

## 2024-04-15 RX ORDER — CLONAZEPAM 1 MG
0.5 TABLET ORAL DAILY
Refills: 0 | Status: DISCONTINUED | OUTPATIENT
Start: 2024-04-15 | End: 2024-04-17

## 2024-04-15 RX ORDER — IOHEXOL 300 MG/ML
30 INJECTION, SOLUTION INTRAVENOUS ONCE
Refills: 0 | Status: COMPLETED | OUTPATIENT
Start: 2024-04-15 | End: 2024-04-15

## 2024-04-15 RX ORDER — DILTIAZEM HCL 120 MG
240 CAPSULE, EXT RELEASE 24 HR ORAL DAILY
Refills: 0 | Status: DISCONTINUED | OUTPATIENT
Start: 2024-04-15 | End: 2024-04-17

## 2024-04-15 RX ORDER — ISOSORBIDE MONONITRATE 60 MG/1
30 TABLET, EXTENDED RELEASE ORAL DAILY
Refills: 0 | Status: DISCONTINUED | OUTPATIENT
Start: 2024-04-15 | End: 2024-04-17

## 2024-04-15 RX ORDER — CLOPIDOGREL BISULFATE 75 MG/1
75 TABLET, FILM COATED ORAL DAILY
Refills: 0 | Status: DISCONTINUED | OUTPATIENT
Start: 2024-04-15 | End: 2024-04-17

## 2024-04-15 RX ORDER — ASPIRIN/CALCIUM CARB/MAGNESIUM 324 MG
81 TABLET ORAL DAILY
Refills: 0 | Status: DISCONTINUED | OUTPATIENT
Start: 2024-04-15 | End: 2024-04-17

## 2024-04-15 RX ORDER — SACCHAROMYCES BOULARDII 250 MG
250 POWDER IN PACKET (EA) ORAL
Refills: 0 | Status: DISCONTINUED | OUTPATIENT
Start: 2024-04-15 | End: 2024-04-17

## 2024-04-15 RX ADMIN — Medication 240 MILLIGRAM(S): at 05:32

## 2024-04-15 RX ADMIN — Medication 81 MILLIGRAM(S): at 11:54

## 2024-04-15 RX ADMIN — Medication 0.5 MILLIGRAM(S): at 21:18

## 2024-04-15 RX ADMIN — ATORVASTATIN CALCIUM 80 MILLIGRAM(S): 80 TABLET, FILM COATED ORAL at 21:16

## 2024-04-15 RX ADMIN — ATORVASTATIN CALCIUM 20 MILLIGRAM(S): 80 TABLET, FILM COATED ORAL at 00:41

## 2024-04-15 RX ADMIN — Medication 100 MILLIGRAM(S): at 05:32

## 2024-04-15 RX ADMIN — SODIUM CHLORIDE 75 MILLILITER(S): 9 INJECTION, SOLUTION INTRAVENOUS at 00:41

## 2024-04-15 RX ADMIN — Medication 5 MILLIGRAM(S): at 00:41

## 2024-04-15 RX ADMIN — ISOSORBIDE MONONITRATE 30 MILLIGRAM(S): 60 TABLET, EXTENDED RELEASE ORAL at 11:54

## 2024-04-15 RX ADMIN — CLOPIDOGREL BISULFATE 75 MILLIGRAM(S): 75 TABLET, FILM COATED ORAL at 11:54

## 2024-04-15 RX ADMIN — Medication 100 MILLIGRAM(S): at 21:16

## 2024-04-15 RX ADMIN — Medication 100 MILLIGRAM(S): at 15:13

## 2024-04-15 RX ADMIN — POLYETHYLENE GLYCOL 3350 17 GRAM(S): 17 POWDER, FOR SOLUTION ORAL at 17:53

## 2024-04-15 RX ADMIN — Medication 200 MILLIGRAM(S): at 17:49

## 2024-04-15 RX ADMIN — Medication 250 MILLIGRAM(S): at 17:50

## 2024-04-15 RX ADMIN — SENNA PLUS 2 TABLET(S): 8.6 TABLET ORAL at 21:17

## 2024-04-15 RX ADMIN — Medication 200 MILLIGRAM(S): at 05:32

## 2024-04-15 RX ADMIN — IOHEXOL 30 MILLILITER(S): 300 INJECTION, SOLUTION INTRAVENOUS at 10:16

## 2024-04-15 NOTE — PROGRESS NOTE ADULT - ASSESSMENT
68-year-old male with past medical history of hypertension, acidophilic esophagitis, recent oral/ dental infection  s/p  abx  rx, and h/o  incarcerated hernia -supra umbilical/  bl inguinal  s/p  repair more than  7 yrs  ago by DR CHRISTINA,  who presented to the ED with constipation and urinary retention.  Reports that the last full bowel movement and full urination that he had was about 2 days prior to presentation.  Reported that he had not been able to have any bowel movement for the past 2 days until earlier when he had very small amount of stool leakage after having Colace and over-the-counter suppository      CT--IV CONTRAST: Inflammatory changes surrounding the rectum suspicious for infectious or inflammatory etiology. Scattered foci are air within the rectal wall; developing pneumatosis is not excluded and follow-up is recommended    #Colitis  - remain NPO, may advance pending CT this morning  - cont IVF  - cont IV  ABX  CIPRO/ FLAGYL  - PAIN MGMT  - ZOFRAN PRN  - F/U repeat CT this morning  - DVT/ GI PROPHYLAXIS    Above plan is tentative pending d/w Dr. Jorge

## 2024-04-15 NOTE — CONSULT NOTE ADULT - SUBJECTIVE AND OBJECTIVE BOX
· HPI Objective Statement: 68-year-old male with past medical history of hypertension, acidophilic esophagitis, recent oral/ dental infection  s/p  abx  rx, and h/o  incarcerated hernia -supra umbilical/  bl inguinal  s/p  repair more than  7 yrs  ago by DR CHRISTINA,  who presents to the ED with constipation and urinary retention.  Reports that the last full bowel movement and full urination that he had was about 2 days ago.  Reports that he has not been able to have any bowel movement for the past 2 days until earlier today when he had very small amount of stool leakage after having Colace and over-the-counter suppository, admits to  large  bms  in ed,  Also endorses suprapubic fullness,unable  to  voidx 2  days,  faye placed  in ed 800 cc straw  colored  urine output   Denies fever, shortness of breath, chest pain, nausea, vomiting, hematuria, dysuria, and melena/hematochezia.    HIV:    HIV Test Questions:  · In accordance with [place] State law, we offer every patient who comes to our ED an HIV test. Would you like to be tested today?	Opt out    PAST MEDICAL/SURGICAL/FAMILY/SOCIAL HISTORY:    Past Medical, Past Surgical, and Family History:  PAST MEDICAL HISTORY:  Eosinophilic esophagitis     HTN (hypertension).   h/o dental infection  1 wk ago,     PAST SURGICAL HISTORY:  Intestinal obstruction   supraumbilical hernia  repair  10 yr  ago  Strangulated b/l  inguinal hernia. repair- 7 yr  ago    FAMILY HISTORY:  Family history of bone cancer, mother; type not known.  Family history of heart disease, father  Family history of polycythemia vera, father.     Tobacco Usage:  · Tobacco Usage	Unknown if ever smoked    ALLERGIES AND HOME MEDICATIONS:   Allergies:        Allergies:  	No Known Allergies:     Home Medications:   * Patient Currently Takes Medications as of 2024 18:30 documented in Structured Notes  · 	DilTIAZem (Eqv-Cardizem CD) 240 mg/24 hours oral capsule, extended release: Last Dose Taken:  , 1 cap(s) orally  · 	aspirin 81 mg oral tablet: Last Dose Taken:  , orally  · 	clonazePAM 0.125 mg oral tablet: Last Dose Taken:  , orally  · 	isosorbide mononitrate 30 mg oral tablet, extended release: Last Dose Taken:  , 1 tab(s) orally  · 	Plavix 75 mg oral tablet: 1 tab(s) orally  · 	rosuvastatin 20 mg oral tablet: 1 tab(s) orally    PHYSICAL EXAM:   · Physical Examination: CONSTITUTIONAL:seen in  ED  in [facility]  	ENT: Hearing is intact with good acuity to spoken voice.  Patient is speaking clearly, not muffled and airway is intact.   	RESPIRATORY: No signs of respiratory distress. Lung sounds are clear in all lobes bilaterally without rales, rhonchi, or wheezes.  	CARDIOVASCULAR: Regular rate and rhythm.   	GI: mild tenderness to palpation in the suprapubic area. Abdomen is soft, and without distention. Bowel sounds are present and normoactive in all four quadrants. No masses are noted.    FAYE CATH  IN PLACE    	Rectal: Chaperone: PCA Dinah. Normal rectal sphincter tone. No external masses or lesions. No bright red blood or melena noticed. Fecal impaction noticed.   	NEURO: A & O x 3. Normal speech. No focal deficit.  PSYCHOLOGICAL: Appropriate mood and affect. Good judgement and insight.                        15.5   17.40 )-----------( 146      ( 2024 19:00 )             43.5   -14    133<L>  |  100  |  16  ----------------------------<  127<H>  4.4   |  22  |  1.0    Ca    9.7      2024 19:00    TPro  6.6  /  Alb  4.5  /  TBili  0.6  /  DBili  x   /  AST  22  /  ALT  22  /  AlkPhos  81  04-14  Urinalysis Basic - ( 2024 20:00 )    Color: Yellow / Appearance: Clear / S.023 / pH: x  Gluc: x / Ketone: Trace mg/dL  / Bili: Negative / Urobili: 1.0 mg/dL   Blood: x / Protein: Negative mg/dL / Nitrite: Negative   Leuk Esterase: Negative / RBC: x / WBC x   Sq Epi: x / Non Sq Epi: x / Bacteria: x  ACC: [id number] EXAM:  CT ABDOMEN AND PELVIS IC   ORDERED BY: MAGGIE HALL     PROCEDURE DATE:  2024          INTERPRETATION:  CLINICAL STATEMENT: Constipation    TECHNIQUE: Contiguous axial CT images were obtained of the abdomen and   pelvis following administration of intravenous contrast.  Oral contrast   was not administered. Reformatted images in the coronal and sagittal   planes were acquired.    COMPARISON CT: None    FINDINGS:    LOWER CHEST: Bibasilar subsegmental atelectasis.    HEPATOBILIARY: Unremarkable.    SPLEEN: Within normal limits.    ADRENALS: Within normal limits.    PANCREAS: Within normal limits.    KIDNEYS: Symmetric renal enhancement. No hydronephrosis.    ABDOMINOPELVIC NODES: No enlarged abdominal or pelvic lymph nodes.    PELVIC ORGANS: Underdistended urinary bladder a Faye catheter, limiting   evaluation. Intraluminal air is noted    PERITONEUM/MESENTERY/BOWEL: No bowel obstruction, ascites or free   intraperitoneal air. The appendix is not definitely identified, however   there is no pericecal inflammation to suggest acute appendicitis.     Inflammatory changes surrounding the rectum. There is air within the   rectal wall , 302/285    BONES/SOFT TISSUES: Degenerative changes of the spine.  Postsurgical   changes anterior abdominal wall      IMPRESSION:  Inflammatory changes surrounding the rectum suspicious for infectious or   inflammatory etiology. Scattered foci are air within the rectal wall;   developing pneumatosis is not excluded and follow-up is recommended    Underdistended urinary bladder surrounding a Faye catheter, limiting   evaluation. Intraluminal air is noted which may be related to   instrumentation    --- End of Report ---            NORAH GOYAL MD; Attending Radiologist  This document has been electronically signed. 2024 10:10PM            Assessment and Recommendation:   · Assessment	  COLITIS  CT--IV CONTRAST  IMPRESSION:  Inflammatory changes surrounding the rectum suspicious for infectious or   inflammatory etiology. Scattered foci are air within the rectal wall;   developing pneumatosis is not excluded and follow-up is recommended    Underdistended urinary bladder surrounding a Faye catheter, limiting   evaluation. Intraluminal air is noted which may be related to   instrumentation  D/W  DR PEARCE  d/w  DR Edmondson  NPO  IVF  IV  ABX  CIPRO/ FLAGYL  PAIN MGMT  ZOFRAN PRN  CONTINUE   FAYE CATHETER  REPEAT   CT  A&P WITH  PO  CONTRAST  IN AM  DVT/ GI PROPHYLAXIS  WILL FOLLOW · HPI Objective Statement: 68-year-old male with past medical history of hypertension, acidophilic esophagitis, recent oral/ dental infection  s/p  abx  rx, and h/o  incarcerated hernia -supra umbilical/  bl inguinal  s/p  repair more than  7 yrs  ago by DR CHRISTINA,  who presents to the ED with constipation and urinary retention.  Reports that the last full bowel movement and full urination that he had was about 2 days ago.  Reports that he has not been able to have any bowel movement for the past 2 days until earlier today when he had very small amount of stool leakage after having Colace and over-the-counter suppository, admits to  large  bms  in ed,  Also endorses suprapubic fullness,unable  to  voidx 2  days,  faye placed  in ed 800 cc straw  colored  urine output   Denies fever, shortness of breath, chest pain, nausea, vomiting, hematuria, dysuria, and melena/hematochezia.    HIV:    HIV Test Questions:  · In accordance with [place] law, we offer every patient who comes to our ED an HIV test. Would you like to be tested today?	Opt out    PAST MEDICAL/SURGICAL/FAMILY/SOCIAL HISTORY:    Past Medical, Past Surgical, and Family History:  PAST MEDICAL HISTORY:  Eosinophilic esophagitis     HTN (hypertension).   h/o dental infection  1 wk ago,     PAST SURGICAL HISTORY:  Intestinal obstruction   supraumbilical hernia  repair  10 yr  ago  Strangulated b/l  inguinal hernia. repair- 7 yr  ago    FAMILY HISTORY:  Family history of bone cancer, mother; type not known.  Family history of heart disease, father  Family history of polycythemia vera, father.     Tobacco Usage:  · Tobacco Usage	Unknown if ever smoked    ALLERGIES AND HOME MEDICATIONS:   Allergies:        Allergies:  	No Known Allergies:     Home Medications:   * Patient Currently Takes Medications as of 2024 18:30 documented in Structured Notes  · 	DilTIAZem (Eqv-Cardizem CD) 240 mg/24 hours oral capsule, extended release: Last Dose Taken:  , 1 cap(s) orally  · 	aspirin 81 mg oral tablet: Last Dose Taken:  , orally  · 	clonazePAM 0.125 mg oral tablet: Last Dose Taken:  , orally  · 	isosorbide mononitrate 30 mg oral tablet, extended release: Last Dose Taken:  , 1 tab(s) orally  · 	Plavix 75 mg oral tablet: 1 tab(s) orally  · 	rosuvastatin 20 mg oral tablet: 1 tab(s) orally    PHYSICAL EXAM:   · Physical Examination: CONSTITUTIONAL:seen in  ED  in [facility]  	ENT: Hearing is intact with good acuity to spoken voice.  Patient is speaking clearly, not muffled and airway is intact.   	RESPIRATORY: No signs of respiratory distress. Lung sounds are clear in all lobes bilaterally without rales, rhonchi, or wheezes.  	CARDIOVASCULAR: Regular rate and rhythm.   	GI: mild tenderness to palpation in the suprapubic area. Abdomen is soft, and without distention. Bowel sounds are present and normoactive in all four quadrants. No masses are noted.    FAYE CATH  IN PLACE    	Rectal: Chaperone: PCA Dinah. Normal rectal sphincter tone. No external masses or lesions. No bright red blood or melena noticed. Fecal impaction noticed.   	NEURO: A & O x 3. Normal speech. No focal deficit.  PSYCHOLOGICAL: Appropriate mood and affect. Good judgement and insight.                        15.5   17.40 )-----------( 146      ( 2024 19:00 )             43.5   -14    133<L>  |  100  |  16  ----------------------------<  127<H>  4.4   |  22  |  1.0    Ca    9.7      2024 19:00    TPro  6.6  /  Alb  4.5  /  TBili  0.6  /  DBili  x   /  AST  22  /  ALT  22  /  AlkPhos  81  04-14  Urinalysis Basic - ( 2024 20:00 )    Color: Yellow / Appearance: Clear / S.023 / pH: x  Gluc: x / Ketone: Trace mg/dL  / Bili: Negative / Urobili: 1.0 mg/dL   Blood: x / Protein: Negative mg/dL / Nitrite: Negative   Leuk Esterase: Negative / RBC: x / WBC x   Sq Epi: x / Non Sq Epi: x / Bacteria: x  ACC: [id number] EXAM:  CT ABDOMEN AND PELVIS IC   ORDERED BY: MAGGIE HALL     PROCEDURE DATE:  2024          INTERPRETATION:  CLINICAL STATEMENT: Constipation    TECHNIQUE: Contiguous axial CT images were obtained of the abdomen and   pelvis following administration of intravenous contrast.  Oral contrast   was not administered. Reformatted images in the coronal and sagittal   planes were acquired.    COMPARISON CT: None    FINDINGS:    LOWER CHEST: Bibasilar subsegmental atelectasis.    HEPATOBILIARY: Unremarkable.    SPLEEN: Within normal limits.    ADRENALS: Within normal limits.    PANCREAS: Within normal limits.    KIDNEYS: Symmetric renal enhancement. No hydronephrosis.    ABDOMINOPELVIC NODES: No enlarged abdominal or pelvic lymph nodes.    PELVIC ORGANS: Underdistended urinary bladder a Faye catheter, limiting   evaluation. Intraluminal air is noted    PERITONEUM/MESENTERY/BOWEL: No bowel obstruction, ascites or free   intraperitoneal air. The appendix is not definitely identified, however   there is no pericecal inflammation to suggest acute appendicitis.     Inflammatory changes surrounding the rectum. There is air within the   rectal wall , 302/285    BONES/SOFT TISSUES: Degenerative changes of the spine.  Postsurgical   changes anterior abdominal wall      IMPRESSION:  Inflammatory changes surrounding the rectum suspicious for infectious or   inflammatory etiology. Scattered foci are air within the rectal wall;   developing pneumatosis is not excluded and follow-up is recommended    Underdistended urinary bladder surrounding a Faey catheter, limiting   evaluation. Intraluminal air is noted which may be related to   instrumentation    --- End of Report ---            NORAH GOYAL MD; Attending Radiologist  This document has been electronically signed. 2024 10:10PM            Assessment and Recommendation:   · Assessment	  COLITIS  CT--IV CONTRAST  IMPRESSION:  Inflammatory changes surrounding the rectum suspicious for infectious or   inflammatory etiology. Scattered foci are air within the rectal wall;   developing pneumatosis is not excluded and follow-up is recommended    Underdistended urinary bladder surrounding a Faye catheter, limiting   evaluation. Intraluminal air is noted which may be related to   instrumentation  D/W  DR PEARCE  d/w  DR Edmondson  NPO  IVF  IV  ABX  CIPRO/ FLAGYL  PAIN MGMT  ZOFRAN PRN  d/c-FAYE CATHETER-once  medically stable  -TRIAL OF  VOID  REPEAT   CT  A&P WITH  PO  CONTRAST  IN AM  DVT/ GI PROPHYLAXIS  WILL FOLLOW

## 2024-04-15 NOTE — CONSULT NOTE ADULT - SUBJECTIVE AND OBJECTIVE BOX
Chief complaint/Reason for consult: constipation    HPI:  68-year-old male with past medical history of hypertension, acidophilic esophagitis, recent oral/ dental infection  s/p  abx  rx, and h/o  incarcerated hernia -supra umbilical/  bl inguinal  s/p  repair more than  7 yrs  ago by DR CHRISTINA,  who presents to the ED with constipation and urinary retention.  Reports that the last full bowel movement and full urination that he had was about 2 days ago.  Reports that he has not been able to have any bowel movement for the past 2 days until earlier today when he had very small amount of stool leakage after having Colace and over-the-counter suppository, admits to  large  bms  in ed,  Also endorses suprapubic fullness, hendricks placed  in ed 800 cc straw  colored  urine output   Denies fever, shortness of breath, chest pain, nausea, vomiting, hematuria, dysuria, and melena/hematochezia. (14 Apr 2024 23:28)    GI updates: 68yMale pmh eosinophilic esophagitis, HTN, presents for constipation and urinary retention. Patient s/p fecal disimpaction on weekend, urinary Hendricks and felt tremendously better. Patient having regular bms now. Currently Patient denies nausea, vomiting, hematemesis, melena, blood in stool, diarrhea, constipation, abdominal pain. Patient reports no colonoscopy for about 16 years.      PAST MEDICAL & SURGICAL HISTORY:   Eosinophilic esophagitis      HTN (hypertension)      Strangulated inguinal hernia      Intestinal obstruction            Family history:  FAMILY HISTORY:  Family history of polycythemia vera  father    Family history of heart disease  father    Family history of bone cancer  mother; type not known.      No GI cancers in first or second degree relatives    Social History: No smoking. No alcohol. No illegal drug use.    Allergies:   No Known Allergies  Intolerances      MEDICATIONS: Home Medications:  aspirin 81 mg oral tablet: orally (14 Apr 2024 23:30)  clonazePAM 0.125 mg oral tablet: orally (14 Apr 2024 23:30)  DilTIAZem (Eqv-Cardizem CD) 240 mg/24 hours oral capsule, extended release: 1 cap(s) orally (14 Apr 2024 23:30)  isosorbide mononitrate 30 mg oral tablet, extended release: 1 tab(s) orally (14 Apr 2024 23:30)  Plavix 75 mg oral tablet: 1 tab(s) orally (14 Apr 2024 23:30)  rosuvastatin 20 mg oral tablet: 1 tab(s) orally (14 Apr 2024 23:30)    MEDICATIONS  (STANDING):  aspirin enteric coated 81 milliGRAM(s) Oral daily  atorvastatin 20 milliGRAM(s) Oral at bedtime  atorvastatin 80 milliGRAM(s) Oral at bedtime  chlorhexidine 2% Cloths 1 Application(s) Topical <User Schedule>  ciprofloxacin   IVPB 400 milliGRAM(s) IV Intermittent every 12 hours  clopidogrel Tablet 75 milliGRAM(s) Oral daily  diltiazem    milliGRAM(s) Oral daily  heparin   Injectable 5000 Unit(s) SubCutaneous every 12 hours  isosorbide   mononitrate ER Tablet (IMDUR) 30 milliGRAM(s) Oral daily  lactated ringers. 1000 milliLiter(s) (75 mL/Hr) IV Continuous <Continuous>  metroNIDAZOLE  IVPB 500 milliGRAM(s) IV Intermittent every 8 hours    MEDICATIONS  (PRN):  acetaminophen     Tablet .. 650 milliGRAM(s) Oral every 6 hours PRN Temp greater or equal to 38C (100.4F), Mild Pain (1 - 3)  aluminum hydroxide/magnesium hydroxide/simethicone Suspension 30 milliLiter(s) Oral every 4 hours PRN Dyspepsia  clonazePAM  Tablet 0.5 milliGRAM(s) Oral daily PRN anxiety  melatonin 5 milliGRAM(s) Oral at bedtime PRN Insomnia  ondansetron Injectable 4 milliGRAM(s) IV Push every 8 hours PRN Nausea and/or Vomiting        REVIEW OF SYSTEMS  General:  No weight loss, fevers, or chills.  Eyes:  No reported pain or visual changes  ENT:  No sore throat or runny nose.  NECK: No stiffness or lymphadenopathy  CV:  No chest pain or palpitations.  Resp:  No shortness of breath, cough, wheezing or hemoptysis  GI:  No abdominal pain, nausea, vomiting, dysphagia, diarrhea +constipation. No rectal bleeding, melena, or hematemesis.  Muscle:  No aches or weakness  Neuro:  No tingling, numbness       VITALS:   T(F): 98.2 (04-15-24 @ 05:13), Max: 99 (04-14-24 @ 18:23)  HR: 72 (04-15-24 @ 05:13) (72 - 85)  BP: 112/64 (04-15-24 @ 05:13) (112/64 - 139/79)  RR: 18 (04-15-24 @ 05:13) (18 - 19)  SpO2: 98% (04-15-24 @ 00:16) (97% - 99%)    PHYSICAL EXAM:  GENERAL: AAOx3, no acute distress.  HEAD:  Atraumatic, Normocephalic  EYES: conjunctiva and sclera clear  NECK: Supple, No thyromegaly   CHEST/LUNG: Clear to auscultation bilaterally; No wheeze, rhonchi, or rales  HEART: Regular rate and rhythm; normal S1, S2, No murmurs.  ABDOMEN: Soft, nontender, nondistended; Bowel sounds present  NEUROLOGY: No asterixis or tremor  SKIN: Intact, no jaundice          LABS:  04-15    141  |  106  |  10  ----------------------------<  121<H>  4.0   |  24  |  0.9    Ca    9.0      15 Apr 2024 08:28    TPro  6.0  /  Alb  4.1  /  TBili  0.9  /  DBili  x   /  AST  16  /  ALT  18  /  AlkPhos  73  04-15                          15.1   10.21 )-----------( 122      ( 15 Apr 2024 08:28 )             42.7     LIVER FUNCTIONS - ( 15 Apr 2024 08:28 )  Alb: 4.1 g/dL / Pro: 6.0 g/dL / ALK PHOS: 73 U/L / ALT: 18 U/L / AST: 16 U/L / GGT: x           PT/INR - ( 15 Apr 2024 08:28 )   PT: 13.80 sec;   INR: 1.21 ratio         PTT - ( 15 Apr 2024 08:28 )  PTT:27.8 sec    IMAGING:      < from: CT Abdomen and Pelvis w/ IV Cont (04.14.24 @ 21:12) >  ACC: [id number] EXAM:  CT ABDOMEN AND PELVIS IC   ORDERED BY: MAGGIE HALL     PROCEDURE DATE:  04/14/2024          INTERPRETATION:  CLINICAL STATEMENT: Constipation    TECHNIQUE: Contiguous axial CT images were obtained of the abdomen and   pelvis following administration of intravenous contrast.  Oral contrast   was not administered. Reformatted images in the coronal and sagittal   planes were acquired.    COMPARISON CT: None    FINDINGS:    LOWER CHEST: Bibasilar subsegmental atelectasis.    HEPATOBILIARY: Unremarkable.    SPLEEN: Within normal limits.    ADRENALS: Within normal limits.    PANCREAS: Within normal limits.    KIDNEYS: Symmetric renal enhancement. No hydronephrosis.    ABDOMINOPELVIC NODES: No enlarged abdominal or pelvic lymph nodes.    PELVIC ORGANS: Underdistended urinary bladder a Hendricks catheter, limiting   evaluation. Intraluminal air is noted    PERITONEUM/MESENTERY/BOWEL: No bowel obstruction, ascites or free   intraperitoneal air. The appendix is not definitely identified, however   there is no pericecal inflammation to suggest acute appendicitis.     Inflammatory changes surrounding the rectum. There is air within the   rectal wall , 302/285    BONES/SOFT TISSUES: Degenerative changes of the spine.  Postsurgical   changes anterior abdominal wall      IMPRESSION:  Inflammatory changes surrounding the rectum suspicious for infectious or   inflammatory etiology. Scattered foci are air within the rectal wall;   developing pneumatosis is not excluded and follow-up is recommended    Underdistended urinary bladder surrounding a Hendricks catheter, limiting   evaluation. Intraluminal air is noted which may be related to   instrumentation    --- End of Report ---            NORAH GOYAL MD; Attending Radiologist  This document has been electronically signed. Apr 14 2024 10:10PM    < end of copied text >

## 2024-04-15 NOTE — PATIENT PROFILE ADULT - FALL HARM RISK - HARM RISK INTERVENTIONS

## 2024-04-15 NOTE — CONSULT NOTE ADULT - NS ATTEND AMEND GEN_ALL_CORE FT
69yo male presents with worsening constipation and CT imaging showing inflammatory changes in rectum with possible pneumatosis, repeat imaging appears improved. Pt feeling better, having bms. Continue serial abd exam. Continue bowel regimen. Plan for colonoscopy as outpt. Will also need to obtain cardiology follow up evaluation and clarify when safely able to hold plavix prior to procedure. pt agreeable with plan.
Agree with above  TOV when constipation is resolved

## 2024-04-15 NOTE — PROGRESS NOTE ADULT - ATTENDING COMMENTS
above noted discussed case with surgical resident and pt abdomen soft no distension/tenderness repeat ct scan and labs noted having BM

## 2024-04-15 NOTE — CHART NOTE - NSCHARTNOTEFT_GEN_A_CORE
Mack was removed by medical team around 3pm - pt voided x2. Bladder scan 17cc.    No further  intervention at this time.

## 2024-04-15 NOTE — PROGRESS NOTE ADULT - ASSESSMENT
68-year-old male with past medical history of hypertension, acidophilic esophagitis, recent oral/ dental infection  s/p  abx  rx, and h/o  incarcerated hernia -supra umbilical/  bl inguinal  s/p  repair more than  7 yrs  ago by DR CHRISTINA,  who presents to the ED with constipation and urinary retention.  Reports that the last full bowel movement and full urination that he had was about 2 days ago.  Reports that he has not been able to have any bowel movement for the past 2 days until earlier today when he had very small amount of stool leakage after having Colace and over-the-counter suppository, admits to  large  bms  in ed,  Also endorses suprapubic fullness, hendricks placed  in ed 800 cc straw  colored  urine output   Denies fever, shortness of breath, chest pain, nausea, vomiting, hematuria, dysuria, and melena/hematochezia.      Rectal Inflammation v.s Infection   Presenting with Constipation , overflow diarrhea and Urinary retention   Repeat CT A/P: Redemonstration of fat stranding surrounding the rectum. Rectum is less   distended compared to prior with decreased stool burden. No rectal pneumatosis is identified.  Patient is having bowel Movements>>Bowel regimen ordered trial of Enema/ suppository    Can start Clears >>advance as tolerated, would hold antibiotics for now  given no other sign of infection  Will Need Colonoscopy O/P , last was more than 10yrs ago   Trial of Viod, Check Renal Bladder Sonogram   Anticipate D/C in AM: pending surgery F/up     Hypertension + HLD : c/w Cardizem, Imdur, DASH and Statin    DVT PPX: Very Ambulatory   GI PPX: feeding   Full Code   Dispo: from Home   Pending TOV and Surgery f /up

## 2024-04-15 NOTE — ED PROCEDURE NOTE - CPROC ED TIME OUT STATEMENT1
“Patient's name, , procedure and correct site were confirmed during the [place] Timeout.”
“Patient's name, , procedure and correct site were confirmed during the [place] Timeout.”

## 2024-04-15 NOTE — CONSULT NOTE ADULT - ASSESSMENT
68yMale pmh eosinophilic esophagitis, HTN, presents for constipation and urinary retention. Patient s/p fecal disimpaction on weekend, urinary Mack and felt tremendously better. Patient having regular bms now.n. Patient reports no colonoscopy for about 16 years.    #Constipation s/p fecal disimpaction  Inflammatory changes surrounding the rectum suspicious for infectious or   inflammatory etiology. Scattered foci are air within the rectal wall;   developing pneumatosis is not excluded and follow-up is recommended  Rec  -repeat CT as per surgical team to followup  -Follow up with our GI office located on [address] [place] [address], Phone number [phone] with Dr. Shipley for outpatient Colonoscopy upon resolution    #Underdistended urinary bladder surrounding a Mack catheter, limiting   evaluation. Intraluminal air is noted which may be related to   instrumentation  Rec   - Care as per primary team  68yMale pmh eosinophilic esophagitis, HTN, presents for constipation and urinary retention. Patient s/p fecal disimpaction on weekend, urinary Mack and felt tremendously better. Patient having regular bms now.n. Patient reports no colonoscopy for about 16 years.    #Constipation s/p fecal disimpaction  Inflammatory changes surrounding the rectum suspicious for infectious or   inflammatory etiology. Scattered foci are air within the rectal wall;   developing pneumatosis is not excluded and follow-up is recommended  repeat CT-Inflammatory changes surrounding the rectum suspicious for infectious or   inflammatory etiology. Scattered foci are air within the rectal wall;   developing pneumatosis is not excluded and follow-up is recommended  Rec  -repeat CT as per surgical team to followup appreciated   -bowel regimen Miralax BID, senna BID  -ambulate as tolerated   -Follow up with our GI office located on [address] [place] [address], Phone number [phone] with Dr. Shipley for outpatient Colonoscopy upon resolution    #Underdistended urinary bladder surrounding a Mack catheter, limiting   evaluation. Intraluminal air is noted which may be related to   instrumentation  Rec   - Care as per primary team  68yMale pmh eosinophilic esophagitis, HTN, presents for constipation and urinary retention. Patient s/p fecal disimpaction on weekend, urinary Mack and felt tremendously better. Patient having regular bms now. Patient reports no colonoscopy for about 16 years.    #Constipation s/p fecal disimpaction  Inflammatory changes surrounding the rectum suspicious for infectious or   inflammatory etiology. Scattered foci are air within the rectal wall;   developing pneumatosis is not excluded and follow-up is recommended  repeat CT-Inflammatory changes surrounding the rectum suspicious for infectious or   inflammatory etiology. Scattered foci are air within the rectal wall;   developing pneumatosis is not excluded and follow-up is recommended  Rec  -repeat CT as per surgical team to followup appreciated   -bowel regimen Miralax BID, senna BID  -ambulate as tolerated   -Follow up with our GI office located on [address] [place] [address], Phone number [phone] with Dr. Shipley for outpatient Colonoscopy upon resolution    #Underdistended urinary bladder surrounding a Mack catheter, limiting   evaluation. Intraluminal air is noted which may be related to   instrumentation  Rec   - Care as per primary team

## 2024-04-16 LAB
CULTURE RESULTS: SIGNIFICANT CHANGE UP
SPECIMEN SOURCE: SIGNIFICANT CHANGE UP

## 2024-04-16 PROCEDURE — 76770 US EXAM ABDO BACK WALL COMP: CPT | Mod: 26

## 2024-04-16 PROCEDURE — 99233 SBSQ HOSP IP/OBS HIGH 50: CPT

## 2024-04-16 PROCEDURE — 99232 SBSQ HOSP IP/OBS MODERATE 35: CPT

## 2024-04-16 RX ORDER — LACTULOSE 10 G/15ML
10 SOLUTION ORAL
Refills: 0 | Status: DISCONTINUED | OUTPATIENT
Start: 2024-04-16 | End: 2024-04-17

## 2024-04-16 RX ORDER — MINERAL OIL
133 OIL (ML) MISCELLANEOUS ONCE
Refills: 0 | Status: COMPLETED | OUTPATIENT
Start: 2024-04-16 | End: 2024-04-16

## 2024-04-16 RX ADMIN — CLOPIDOGREL BISULFATE 75 MILLIGRAM(S): 75 TABLET, FILM COATED ORAL at 12:52

## 2024-04-16 RX ADMIN — Medication 81 MILLIGRAM(S): at 12:52

## 2024-04-16 RX ADMIN — Medication 133 MILLILITER(S): at 17:57

## 2024-04-16 RX ADMIN — POLYETHYLENE GLYCOL 3350 17 GRAM(S): 17 POWDER, FOR SOLUTION ORAL at 05:30

## 2024-04-16 RX ADMIN — Medication 200 MILLIGRAM(S): at 17:58

## 2024-04-16 RX ADMIN — Medication 250 MILLIGRAM(S): at 17:58

## 2024-04-16 RX ADMIN — POLYETHYLENE GLYCOL 3350 17 GRAM(S): 17 POWDER, FOR SOLUTION ORAL at 17:58

## 2024-04-16 RX ADMIN — Medication 250 MILLIGRAM(S): at 05:30

## 2024-04-16 RX ADMIN — Medication 200 MILLIGRAM(S): at 05:34

## 2024-04-16 RX ADMIN — Medication 100 MILLIGRAM(S): at 05:34

## 2024-04-16 RX ADMIN — SENNA PLUS 2 TABLET(S): 8.6 TABLET ORAL at 22:56

## 2024-04-16 RX ADMIN — Medication 240 MILLIGRAM(S): at 05:30

## 2024-04-16 RX ADMIN — Medication 10 MILLIGRAM(S): at 22:54

## 2024-04-16 RX ADMIN — CHLORHEXIDINE GLUCONATE 1 APPLICATION(S): 213 SOLUTION TOPICAL at 05:30

## 2024-04-16 RX ADMIN — ATORVASTATIN CALCIUM 80 MILLIGRAM(S): 80 TABLET, FILM COATED ORAL at 22:57

## 2024-04-16 RX ADMIN — LACTULOSE 10 GRAM(S): 10 SOLUTION ORAL at 17:58

## 2024-04-16 RX ADMIN — ISOSORBIDE MONONITRATE 30 MILLIGRAM(S): 60 TABLET, EXTENDED RELEASE ORAL at 12:52

## 2024-04-16 RX ADMIN — Medication 100 MILLIGRAM(S): at 12:52

## 2024-04-16 RX ADMIN — Medication 100 MILLIGRAM(S): at 22:57

## 2024-04-16 NOTE — PROGRESS NOTE ADULT - ASSESSMENT
68-year-old male with past medical history of hypertension, acidophilic esophagitis, recent oral/ dental infection  s/p  abx  rx, and h/o  incarcerated hernia -supra umbilical/  bl inguinal  s/p  repair more than  7 yrs  ago by DR CHRISTINA,  who presents to the ED with constipation and urinary retention.  Reports that the last full bowel movement and full urination that he had was about 2 days ago.  Reports that he has not been able to have any bowel movement for the past 2 days until earlier today when he had very small amount of stool leakage after having Colace and over-the-counter suppository, admits to  large  bms  in ed,  Also endorses suprapubic fullness, hendricks placed  in ed 800 cc straw  colored  urine output   Denies fever, shortness of breath, chest pain, nausea, vomiting, hematuria, dysuria, and melena/hematochezia.      Rectal Inflammation v.s Infection   Presenting with Constipation , overflow diarrhea and Urinary retention   S/P Disimpaction x2 in the Emergency room on admission   Repeat CT A/P: Redemonstration of fat stranding surrounding the rectum. Rectum is less   distended compared to prior with decreased stool burden. No rectal pneumatosis is identified.  No bowel movement today but feels the urge to go, GI PA offered Disimpaction today   Bowel regimen ordered with Enema and  suppository,   passed TOV yesterday but seems to be retaining again   Renal Bladder Sono ordered; pending read>> pt is urinating but not with full stream   Will Need Colonoscopy O/P , last was more than 10yrs ago and endorsed family H/o of colon ca       Hypertension + HLD : c/w Cardizem, Imdur, DASH and Statin    DVT PPX: Very Ambulatory   GI PPX: feeding   Full Code   Dispo: from Home   Pending improvement in BM and retention  68-year-old male with past medical history of hypertension, acidophilic esophagitis, recent oral/ dental infection  s/p  abx  rx, and h/o  incarcerated hernia -supra umbilical/  bl inguinal  s/p  repair more than  7 yrs  ago by DR CHRISTINA,  who presents to the ED with constipation and urinary retention.  Reports that the last full bowel movement and full urination that he had was about 2 days ago.  Reports that he has not been able to have any bowel movement for the past 2 days until earlier today when he had very small amount of stool leakage after having Colace and over-the-counter suppository, admits to  large  bms  in ed,  Also endorses suprapubic fullness, hendricks placed  in ed 800 cc straw  colored  urine output   Denies fever, shortness of breath, chest pain, nausea, vomiting, hematuria, dysuria, and melena/hematochezia.      Rectal Inflammation v.s Infection   Presenting with Constipation , overflow diarrhea and Urinary retention   S/P Disimpaction x2 in the Emergency room on admission   Repeat CT A/P: Redemonstration of fat stranding surrounding the rectum. Rectum is less   distended compared to prior with decreased stool burden. No rectal pneumatosis is identified.  No bowel movement today but feels the urge to go, GI PA offered Disimpaction today   Bowel regimen ordered with Enema and suppository,   passed TOV yesterday but seems to be retaining again   Renal Bladder Sono ordered; pending read>> pt is urinating but not with full stream   Will Need Colonoscopy O/P , last was more than 10yrs ago and endorsed family H/o of colon ca   Will Keep antibiotics with 5 day course given leukocytosis       Hypertension + HLD : c/w Cardizem, Imdur, DASH and Statin    DVT PPX: Very Ambulatory   GI PPX: feeding   Full Code   Dispo: from Home   Pending improvement in BM and retention

## 2024-04-16 NOTE — PROGRESS NOTE ADULT - ATTENDING COMMENTS
above noted discussed case with surgical resident abdomen soft no distension/tenderness tolerating po intake and having bm

## 2024-04-16 NOTE — PROGRESS NOTE ADULT - ASSESSMENT
68-year-old male with past medical history of hypertension, acidophilic esophagitis, recent oral/ dental infection  s/p  abx  rx, and h/o  incarcerated hernia -supra umbilical/  bl inguinal  s/p  repair more than  7 yrs  ago by DR CHRISTINA,  who presented to the ED with constipation and urinary retention.  Reports that the last full bowel movement and full urination that he had was about 2 days prior to presentation.  Reported that he had not been able to have any bowel movement for the past 2 days until earlier when he had very small amount of stool leakage after having Colace and over-the-counter suppository      CT--IV CONTRAST 4/14: Inflammatory changes surrounding the rectum suspicious for infectious or inflammatory etiology. Scattered foci are air within the rectal wall; developing pneumatosis is not excluded and follow-up is recommended  Repeat CT 4/15 - redemonstration of fat stranding surrounding rectum; rectum less distended with decreased stool burden; no rectal pneumatosis identified    #Colitis  - DASH diet; tolerating  - cont IV antibiotics while in the hospital  - no acute surgical intervention  - recall surgery PRN      Above plan is tentative pending d/w Dr. Jorge   68-year-old male with past medical history of hypertension, acidophilic esophagitis, recent oral/ dental infection  s/p  abx  rx, and h/o  incarcerated hernia -supra umbilical/  bl inguinal  s/p  repair more than  7 yrs  ago by DR CHRISTINA,  who presented to the ED with constipation and urinary retention.  Reports that the last full bowel movement and full urination that he had was about 2 days prior to presentation.  Reported that he had not been able to have any bowel movement for the past 2 days until earlier when he had very small amount of stool leakage after having Colace and over-the-counter suppository      CT--IV CONTRAST 4/14: Inflammatory changes surrounding the rectum suspicious for infectious or inflammatory etiology. Scattered foci are air within the rectal wall; developing pneumatosis is not excluded and follow-up is recommended  Repeat CT 4/15 - redemonstration of fat stranding surrounding rectum; rectum less distended with decreased stool burden; no rectal pneumatosis identified    #Colitis  - DASH diet; tolerating  - cont IV antibiotics while in the hospital  - no acute surgical intervention  -may F/U with Dr. Jorge PRN  - recall surgery PRN  -D/W Dr. Jorge

## 2024-04-16 NOTE — PROGRESS NOTE ADULT - ASSESSMENT
68yMale pmh eosinophilic esophagitis, HTN, presents for constipation and urinary retention. Patient s/p fecal disimpaction on weekend, urinary Mack and felt tremendously better. Patient having regular bms now.n. Patient reports no colonoscopy for about 16 years.    #Constipation s/p fecal disimpaction  Inflammatory changes surrounding the rectum suspicious for infectious or   inflammatory etiology. Scattered foci are air within the rectal wall;   developing pneumatosis is not excluded and follow-up is recommended  repeat CT-Inflammatory changes surrounding the rectum suspicious for infectious or   inflammatory etiology. Scattered foci are air within the rectal wall;   developing pneumatosis is not excluded and follow-up is recommended  Rec  -repeat CT as per surgical team to followup appreciated   -bowel regimen Miralax BID, senna BID  -ambulate as tolerated   -Follow up with our GI office located on [address] [place] [address], Phone number [phone] with Dr. Shipley for outpatient Colonoscopy upon resolution    #Underdistended urinary bladder surrounding a Mack catheter, limiting   evaluation. Intraluminal air is noted which may be related to   instrumentation  Rec   - Care as per primary team      68yMale pmh eosinophilic esophagitis, HTN, presents for constipation and urinary retention. Patient s/p fecal disimpaction on weekend, urinary Mack and felt tremendously better. Patient having regular bms now.n. Patient reports no colonoscopy for about 16 years.    #Constipation s/p fecal disimpaction  Inflammatory changes surrounding the rectum suspicious for infectious or   inflammatory etiology. Scattered foci are air within the rectal wall;   developing pneumatosis is not excluded and follow-up is recommended  repeat CT-Inflammatory changes surrounding the rectum suspicious for infectious or   inflammatory etiology. Scattered foci are air within the rectal wall;   developing pneumatosis is not excluded and follow-up is recommended  Rec  -repeat CT as per surgical team to followup appreciated   -disimpaction again tomorrow if no improvement   -bowel regimen Miralax BID, senna BID  -ambulate as tolerated   -Follow up with our GI office located on [address] [place] [address], Phone number [phone] with Dr. Shipley for outpatient Colonoscopy upon resolution    #Underdistended urinary bladder surrounding a Mack catheter, limiting   evaluation. Intraluminal air is noted which may be related to   instrumentation  Rec   - Care as per primary team

## 2024-04-17 ENCOUNTER — TRANSCRIPTION ENCOUNTER (OUTPATIENT)
Age: 69
End: 2024-04-17

## 2024-04-17 VITALS
DIASTOLIC BLOOD PRESSURE: 83 MMHG | RESPIRATION RATE: 18 BRPM | TEMPERATURE: 97 F | HEART RATE: 80 BPM | SYSTOLIC BLOOD PRESSURE: 134 MMHG

## 2024-04-17 PROCEDURE — 99238 HOSP IP/OBS DSCHRG MGMT 30/<: CPT

## 2024-04-17 PROCEDURE — 99233 SBSQ HOSP IP/OBS HIGH 50: CPT

## 2024-04-17 RX ORDER — ISOSORBIDE MONONITRATE 60 MG/1
1 TABLET, EXTENDED RELEASE ORAL
Refills: 0 | DISCHARGE

## 2024-04-17 RX ORDER — ASPIRIN/CALCIUM CARB/MAGNESIUM 324 MG
0 TABLET ORAL
Refills: 0 | DISCHARGE

## 2024-04-17 RX ORDER — CLONAZEPAM 1 MG
0 TABLET ORAL
Refills: 0 | DISCHARGE

## 2024-04-17 RX ORDER — POLYETHYLENE GLYCOL 3350 17 G/17G
17 POWDER, FOR SOLUTION ORAL
Qty: 1020 | Refills: 0
Start: 2024-04-17 | End: 2024-05-16

## 2024-04-17 RX ORDER — CLONAZEPAM 1 MG
1 TABLET ORAL
Qty: 0 | Refills: 0 | DISCHARGE
Start: 2024-04-17

## 2024-04-17 RX ORDER — SENNA PLUS 8.6 MG/1
2 TABLET ORAL
Qty: 60 | Refills: 0
Start: 2024-04-17 | End: 2024-05-16

## 2024-04-17 RX ORDER — ASPIRIN/CALCIUM CARB/MAGNESIUM 324 MG
1 TABLET ORAL
Qty: 0 | Refills: 0 | DISCHARGE
Start: 2024-04-17

## 2024-04-17 RX ORDER — DILTIAZEM HCL 120 MG
1 CAPSULE, EXT RELEASE 24 HR ORAL
Qty: 0 | Refills: 0 | DISCHARGE
Start: 2024-04-17

## 2024-04-17 RX ORDER — CLOPIDOGREL BISULFATE 75 MG/1
1 TABLET, FILM COATED ORAL
Refills: 0 | DISCHARGE

## 2024-04-17 RX ORDER — ISOSORBIDE MONONITRATE 60 MG/1
1 TABLET, EXTENDED RELEASE ORAL
Qty: 0 | Refills: 0 | DISCHARGE
Start: 2024-04-17

## 2024-04-17 RX ORDER — CLOPIDOGREL BISULFATE 75 MG/1
1 TABLET, FILM COATED ORAL
Qty: 0 | Refills: 0 | DISCHARGE
Start: 2024-04-17

## 2024-04-17 RX ORDER — DILTIAZEM HCL 120 MG
1 CAPSULE, EXT RELEASE 24 HR ORAL
Refills: 0 | DISCHARGE

## 2024-04-17 RX ADMIN — CLOPIDOGREL BISULFATE 75 MILLIGRAM(S): 75 TABLET, FILM COATED ORAL at 11:46

## 2024-04-17 RX ADMIN — Medication 0.5 MILLIGRAM(S): at 00:01

## 2024-04-17 RX ADMIN — Medication 100 MILLIGRAM(S): at 06:36

## 2024-04-17 RX ADMIN — ISOSORBIDE MONONITRATE 30 MILLIGRAM(S): 60 TABLET, EXTENDED RELEASE ORAL at 11:48

## 2024-04-17 RX ADMIN — POLYETHYLENE GLYCOL 3350 17 GRAM(S): 17 POWDER, FOR SOLUTION ORAL at 06:34

## 2024-04-17 RX ADMIN — LACTULOSE 10 GRAM(S): 10 SOLUTION ORAL at 11:45

## 2024-04-17 RX ADMIN — Medication 240 MILLIGRAM(S): at 06:35

## 2024-04-17 RX ADMIN — Medication 81 MILLIGRAM(S): at 11:46

## 2024-04-17 RX ADMIN — Medication 250 MILLIGRAM(S): at 06:36

## 2024-04-17 RX ADMIN — Medication 200 MILLIGRAM(S): at 06:35

## 2024-04-17 NOTE — DISCHARGE NOTE PROVIDER - NSDCMRMEDTOKEN_GEN_ALL_CORE_FT
aspirin 81 mg oral delayed release tablet: 1 tab(s) orally once a day  clonazePAM 0.5 mg oral tablet: 1 tab(s) orally once a day As needed anxiety  clopidogrel 75 mg oral tablet: 1 tab(s) orally once a day  dilTIAZem 240 mg/24 hours oral capsule, extended release: 1 cap(s) orally once a day  isosorbide mononitrate 30 mg oral tablet, extended release: 1 tab(s) orally once a day  polyethylene glycol 3350 oral powder for reconstitution: 17 gram(s) orally 2 times a day as needed for  constipation  rosuvastatin 20 mg oral tablet: 1 tab(s) orally  senna leaf extract oral tablet: 2 tab(s) orally once a day (at bedtime) as needed for  constipation

## 2024-04-17 NOTE — DISCHARGE NOTE PROVIDER - HOSPITAL COURSE
Admission HPI:  68-year-old male with past medical history of hypertension, acidophilic esophagitis, recent oral/ dental infection  s/p  abx  rx, and h/o  incarcerated hernia -supra umbilical/  bl inguinal  s/p  repair more than  7 yrs  ago by DR CHRISTINA,  who presents to the ED with constipation and urinary retention.  Reports that the last full bowel movement and full urination that he had was about 2 days ago.  Reports that he has not been able to have any bowel movement for the past 2 days until earlier today when he had very small amount of stool leakage after having Colace and over-the-counter suppository, admits to  large  bms  in ed,  Also endorses suprapubic fullness, hendricks placed  in ed 800 cc straw  colored  urine output   Denies fever, shortness of breath, chest pain, nausea, vomiting, hematuria, dysuria, and melena/hematochezia.      -overflow diarrhea and Urinary retention   S/P Disimpaction x2 in the Emergency room on admission   Repeat CT A/P: Redemonstration of fat stranding surrounding the rectum. Rectum is less   distended compared to prior with decreased stool burden. No rectal pneumatosis is identified.  No bowel movement today but feels the urge to go, GI PA offered Disimpaction today   Bowel regimen ordered with Enema and suppository,   passed TOV  Renal Bladder Sono normal  Will Need Colonoscopy O/P , last was more than 10yrs ago and endorsed family H/o of colon ca   s/p cipro and Flagyl, low suspicion for infection      Hypertension, CAD + HLD : c/w Cardizem, Imdur, DASH and Statin

## 2024-04-17 NOTE — DISCHARGE NOTE PROVIDER - NSDCCPCAREPLAN_GEN_ALL_CORE_FT
PRINCIPAL DISCHARGE DIAGNOSIS  Diagnosis: Constipation  Assessment and Plan of Treatment: You had severe constipation which led to overflow diarrhea and an inability to urinate.  It is important to always stay hydrated and eat fruits and vegetables daily.  Please follow with your primary care doctor as you will need to have a Colonoscopy in the near future.

## 2024-04-17 NOTE — DISCHARGE NOTE NURSING/CASE MANAGEMENT/SOCIAL WORK - NSDCPEFALRISK_GEN_ALL_CORE
For information on Fall & Injury Prevention, visit: [email or web address] OR  [email or web address] OR  https://www.cdc.gov/steadi/patient.html

## 2024-04-17 NOTE — PROGRESS NOTE ADULT - ASSESSMENT
68yMale pmh eosinophilic esophagitis, HTN, presents for constipation and urinary retention. Patient s/p fecal disimpaction on weekend, urinary Mack and felt tremendously better. Patient having regular bms now.n. Patient reports no colonoscopy for about 16 years.    #Constipation s/p fecal disimpaction  Inflammatory changes surrounding the rectum suspicious for infectious or   inflammatory etiology. Scattered foci are air within the rectal wall;   developing pneumatosis is not excluded and follow-up is recommended  repeat CT-Inflammatory changes surrounding the rectum suspicious for infectious or   inflammatory etiology. Scattered foci are air within the rectal wall;   developing pneumatosis is not excluded and follow-up is recommended  Rec  -repeat CT as per surgical team to followup appreciated   -disimpaction again tomorrow if no improvement   -bowel regimen Miralax BID, senna BID  -ambulate as tolerated   -Follow up with our GI office located on [address] [place] [address], Phone number [phone] with Dr. Shipley for outpatient Colonoscopy upon resolution    #Underdistended urinary bladder surrounding a Mack catheter, limiting   evaluation. Intraluminal air is noted which may be related to   instrumentation  Rec   - Care as per primary team

## 2024-04-17 NOTE — DISCHARGE NOTE NURSING/CASE MANAGEMENT/SOCIAL WORK - PATIENT PORTAL LINK FT
You can access the FollowMyHealth Patient Portal offered by [facility] by registering at the following website: [email or web address]. By joining [facility]’s FollowMyHealth portal, you will also be able to view your health information using other applications (apps) compatible with our system.

## 2024-04-17 NOTE — DISCHARGE NOTE PROVIDER - CARE PROVIDER_API CALL
Abdoulaye Simon  Internal Medicine  [address] [place] [address]  Phone: [phone]  Fax: ([phone]  Follow Up Time:

## 2024-04-24 DIAGNOSIS — Z79.82 LONG TERM (CURRENT) USE OF ASPIRIN: ICD-10-CM

## 2024-04-24 DIAGNOSIS — K59.00 CONSTIPATION, UNSPECIFIED: ICD-10-CM

## 2024-04-24 DIAGNOSIS — E78.5 HYPERLIPIDEMIA, UNSPECIFIED: ICD-10-CM

## 2024-04-24 DIAGNOSIS — Z79.02 LONG TERM (CURRENT) USE OF ANTITHROMBOTICS/ANTIPLATELETS: ICD-10-CM

## 2024-04-24 DIAGNOSIS — I10 ESSENTIAL (PRIMARY) HYPERTENSION: ICD-10-CM

## 2024-04-24 DIAGNOSIS — K52.9 NONINFECTIVE GASTROENTERITIS AND COLITIS, UNSPECIFIED: ICD-10-CM

## 2024-04-24 DIAGNOSIS — R33.9 RETENTION OF URINE, UNSPECIFIED: ICD-10-CM

## 2024-06-25 ENCOUNTER — OUTPATIENT (OUTPATIENT)
Dept: OUTPATIENT SERVICES | Facility: HOSPITAL | Age: 69
LOS: 1 days | Discharge: HOME | End: 2024-06-25
Payer: MEDICARE

## 2024-06-25 ENCOUNTER — TRANSCRIPTION ENCOUNTER (OUTPATIENT)
Age: 69
End: 2024-06-25

## 2024-06-25 VITALS
SYSTOLIC BLOOD PRESSURE: 137 MMHG | WEIGHT: 222.01 LBS | HEART RATE: 76 BPM | TEMPERATURE: 98 F | RESPIRATION RATE: 17 BRPM | HEIGHT: 74 IN | DIASTOLIC BLOOD PRESSURE: 81 MMHG | OXYGEN SATURATION: 98 %

## 2024-06-25 VITALS — HEART RATE: 60 BPM | SYSTOLIC BLOOD PRESSURE: 134 MMHG | DIASTOLIC BLOOD PRESSURE: 83 MMHG | RESPIRATION RATE: 14 BRPM

## 2024-06-25 DIAGNOSIS — I10 ESSENTIAL (PRIMARY) HYPERTENSION: ICD-10-CM

## 2024-06-25 DIAGNOSIS — Z95.5 PRESENCE OF CORONARY ANGIOPLASTY IMPLANT AND GRAFT: Chronic | ICD-10-CM

## 2024-06-25 DIAGNOSIS — K29.80 DUODENITIS WITHOUT BLEEDING: ICD-10-CM

## 2024-06-25 DIAGNOSIS — K57.30 DIVERTICULOSIS OF LARGE INTESTINE WITHOUT PERFORATION OR ABSCESS WITHOUT BLEEDING: ICD-10-CM

## 2024-06-25 DIAGNOSIS — D12.2 BENIGN NEOPLASM OF ASCENDING COLON: ICD-10-CM

## 2024-06-25 DIAGNOSIS — D64.9 ANEMIA, UNSPECIFIED: ICD-10-CM

## 2024-06-25 DIAGNOSIS — K40.30 UNILATERAL INGUINAL HERNIA, WITH OBSTRUCTION, WITHOUT GANGRENE, NOT SPECIFIED AS RECURRENT: Chronic | ICD-10-CM

## 2024-06-25 DIAGNOSIS — Z95.5 PRESENCE OF CORONARY ANGIOPLASTY IMPLANT AND GRAFT: ICD-10-CM

## 2024-06-25 DIAGNOSIS — K21.00 GASTRO-ESOPHAGEAL REFLUX DISEASE WITH ESOPHAGITIS, WITHOUT BLEEDING: ICD-10-CM

## 2024-06-25 DIAGNOSIS — R13.10 DYSPHAGIA, UNSPECIFIED: ICD-10-CM

## 2024-06-25 DIAGNOSIS — K64.1 SECOND DEGREE HEMORRHOIDS: ICD-10-CM

## 2024-06-25 DIAGNOSIS — Z79.82 LONG TERM (CURRENT) USE OF ASPIRIN: ICD-10-CM

## 2024-06-25 DIAGNOSIS — K56.609 UNSPECIFIED INTESTINAL OBSTRUCTION, UNSPECIFIED AS TO PARTIAL VERSUS COMPLETE OBSTRUCTION: Chronic | ICD-10-CM

## 2024-06-25 DIAGNOSIS — K44.9 DIAPHRAGMATIC HERNIA WITHOUT OBSTRUCTION OR GANGRENE: ICD-10-CM

## 2024-06-25 DIAGNOSIS — K22.2 ESOPHAGEAL OBSTRUCTION: ICD-10-CM

## 2024-06-25 DIAGNOSIS — Z12.11 ENCOUNTER FOR SCREENING FOR MALIGNANT NEOPLASM OF COLON: ICD-10-CM

## 2024-06-25 DIAGNOSIS — K21.01 GASTRO-ESOPHAGEAL REFLUX DISEASE WITH ESOPHAGITIS, WITH BLEEDING: ICD-10-CM

## 2024-06-25 PROCEDURE — 88312 SPECIAL STAINS GROUP 1: CPT

## 2024-06-25 PROCEDURE — 88312 SPECIAL STAINS GROUP 1: CPT | Mod: 26

## 2024-06-25 PROCEDURE — 88305 TISSUE EXAM BY PATHOLOGIST: CPT

## 2024-06-25 PROCEDURE — 88305 TISSUE EXAM BY PATHOLOGIST: CPT | Mod: 26,59

## 2024-06-25 RX ORDER — ROSUVASTATIN CALCIUM 5 MG/1
1 TABLET ORAL
Refills: 0 | DISCHARGE

## 2024-06-28 LAB — SURGICAL PATHOLOGY STUDY: SIGNIFICANT CHANGE UP

## 2024-07-04 LAB — SURGICAL PATHOLOGY STUDY: SIGNIFICANT CHANGE UP

## 2024-10-02 NOTE — PRE-ANESTHESIA EVALUATION ADULT - NSATTENDATTESTRD_GEN_ALL_CORE
Spoke to patient advised of recommendations, but she did say that her daily blood sugars range up to 190-200.   The patient has been re-examined and I agree with the above assessment or I updated with my findings.

## 2025-01-27 NOTE — PATIENT PROFILE ADULT - NSPROPOAPRESSUREINJURY_GEN_A_NUR
Detail Level: Detailed Quality 226: Preventive Care And Screening: Tobacco Use: Screening And Cessation Intervention: Patient screened for tobacco use and is an ex/non-smoker Quality 130: Documentation Of Current Medications In The Medical Record: Current Medications Documented no